# Patient Record
Sex: MALE | Race: WHITE | NOT HISPANIC OR LATINO | Employment: FULL TIME | URBAN - NONMETROPOLITAN AREA
[De-identification: names, ages, dates, MRNs, and addresses within clinical notes are randomized per-mention and may not be internally consistent; named-entity substitution may affect disease eponyms.]

---

## 2022-05-20 LAB
EXTERNAL HIV CONFIRMATION: NORMAL
EXTERNAL HIV SCREEN: NORMAL
HCV AB SER-ACNC: NEGATIVE

## 2024-04-25 ENCOUNTER — HOSPITAL ENCOUNTER (EMERGENCY)
Facility: HOSPITAL | Age: 29
Discharge: HOME | End: 2024-04-25
Attending: EMERGENCY MEDICINE
Payer: COMMERCIAL

## 2024-04-25 ENCOUNTER — APPOINTMENT (EMERGENCY)
Dept: CT IMAGING | Facility: HOSPITAL | Age: 29
End: 2024-04-25
Payer: COMMERCIAL

## 2024-04-25 VITALS
BODY MASS INDEX: 31.85 KG/M2 | HEART RATE: 81 BPM | SYSTOLIC BLOOD PRESSURE: 147 MMHG | OXYGEN SATURATION: 98 % | TEMPERATURE: 97.9 F | DIASTOLIC BLOOD PRESSURE: 93 MMHG | WEIGHT: 256.17 LBS | HEIGHT: 75 IN | RESPIRATION RATE: 17 BRPM

## 2024-04-25 DIAGNOSIS — K29.70 GASTRITIS: Primary | ICD-10-CM

## 2024-04-25 LAB
ALBUMIN SERPL BCP-MCNC: 4.9 G/DL (ref 3.5–5)
ALP SERPL-CCNC: 57 U/L (ref 34–104)
ALT SERPL W P-5'-P-CCNC: 25 U/L (ref 7–52)
ANION GAP SERPL CALCULATED.3IONS-SCNC: 6 MMOL/L (ref 4–13)
AST SERPL W P-5'-P-CCNC: 17 U/L (ref 13–39)
BASOPHILS # BLD AUTO: 0.04 THOUSANDS/ÂΜL (ref 0–0.1)
BASOPHILS NFR BLD AUTO: 1 % (ref 0–1)
BILIRUB SERPL-MCNC: 0.56 MG/DL (ref 0.2–1)
BILIRUB UR QL STRIP: NEGATIVE
BUN SERPL-MCNC: 17 MG/DL (ref 5–25)
CALCIUM SERPL-MCNC: 9.4 MG/DL (ref 8.4–10.2)
CHLORIDE SERPL-SCNC: 104 MMOL/L (ref 96–108)
CLARITY UR: CLEAR
CO2 SERPL-SCNC: 29 MMOL/L (ref 21–32)
COLOR UR: YELLOW
CREAT SERPL-MCNC: 0.93 MG/DL (ref 0.6–1.3)
EOSINOPHIL # BLD AUTO: 0.29 THOUSAND/ÂΜL (ref 0–0.61)
EOSINOPHIL NFR BLD AUTO: 4 % (ref 0–6)
ERYTHROCYTE [DISTWIDTH] IN BLOOD BY AUTOMATED COUNT: 11.9 % (ref 11.6–15.1)
FLUAV RNA RESP QL NAA+PROBE: NEGATIVE
FLUBV RNA RESP QL NAA+PROBE: NEGATIVE
GFR SERPL CREATININE-BSD FRML MDRD: 112 ML/MIN/1.73SQ M
GLUCOSE SERPL-MCNC: 80 MG/DL (ref 65–140)
GLUCOSE UR STRIP-MCNC: NEGATIVE MG/DL
HCT VFR BLD AUTO: 47.6 % (ref 36.5–49.3)
HGB BLD-MCNC: 16 G/DL (ref 12–17)
HGB UR QL STRIP.AUTO: NEGATIVE
IMM GRANULOCYTES # BLD AUTO: 0.02 THOUSAND/UL (ref 0–0.2)
IMM GRANULOCYTES NFR BLD AUTO: 0 % (ref 0–2)
KETONES UR STRIP-MCNC: NEGATIVE MG/DL
LACTATE SERPL-SCNC: 0.9 MMOL/L (ref 0.5–2)
LEUKOCYTE ESTERASE UR QL STRIP: NEGATIVE
LIPASE SERPL-CCNC: 23 U/L (ref 11–82)
LYMPHOCYTES # BLD AUTO: 2.34 THOUSANDS/ÂΜL (ref 0.6–4.47)
LYMPHOCYTES NFR BLD AUTO: 31 % (ref 14–44)
MCH RBC QN AUTO: 30 PG (ref 26.8–34.3)
MCHC RBC AUTO-ENTMCNC: 33.6 G/DL (ref 31.4–37.4)
MCV RBC AUTO: 89 FL (ref 82–98)
MONOCYTES # BLD AUTO: 0.51 THOUSAND/ÂΜL (ref 0.17–1.22)
MONOCYTES NFR BLD AUTO: 7 % (ref 4–12)
NEUTROPHILS # BLD AUTO: 4.43 THOUSANDS/ÂΜL (ref 1.85–7.62)
NEUTS SEG NFR BLD AUTO: 57 % (ref 43–75)
NITRITE UR QL STRIP: NEGATIVE
NRBC BLD AUTO-RTO: 0 /100 WBCS
PH UR STRIP.AUTO: 6 [PH]
PLATELET # BLD AUTO: 274 THOUSANDS/UL (ref 149–390)
PMV BLD AUTO: 10.2 FL (ref 8.9–12.7)
POTASSIUM SERPL-SCNC: 3.9 MMOL/L (ref 3.5–5.3)
PROT SERPL-MCNC: 7.6 G/DL (ref 6.4–8.4)
PROT UR STRIP-MCNC: NEGATIVE MG/DL
RBC # BLD AUTO: 5.33 MILLION/UL (ref 3.88–5.62)
RSV RNA RESP QL NAA+PROBE: NEGATIVE
SARS-COV-2 RNA RESP QL NAA+PROBE: NEGATIVE
SODIUM SERPL-SCNC: 139 MMOL/L (ref 135–147)
SP GR UR STRIP.AUTO: 1.01 (ref 1–1.03)
UROBILINOGEN UR QL STRIP.AUTO: 0.2 E.U./DL
WBC # BLD AUTO: 7.63 THOUSAND/UL (ref 4.31–10.16)

## 2024-04-25 PROCEDURE — 96361 HYDRATE IV INFUSION ADD-ON: CPT

## 2024-04-25 PROCEDURE — 36415 COLL VENOUS BLD VENIPUNCTURE: CPT

## 2024-04-25 PROCEDURE — 83605 ASSAY OF LACTIC ACID: CPT | Performed by: PHYSICIAN ASSISTANT

## 2024-04-25 PROCEDURE — 0241U HB NFCT DS VIR RESP RNA 4 TRGT: CPT | Performed by: PHYSICIAN ASSISTANT

## 2024-04-25 PROCEDURE — 96374 THER/PROPH/DIAG INJ IV PUSH: CPT

## 2024-04-25 PROCEDURE — 99284 EMERGENCY DEPT VISIT MOD MDM: CPT

## 2024-04-25 PROCEDURE — 99285 EMERGENCY DEPT VISIT HI MDM: CPT | Performed by: EMERGENCY MEDICINE

## 2024-04-25 PROCEDURE — 81003 URINALYSIS AUTO W/O SCOPE: CPT

## 2024-04-25 PROCEDURE — 96375 TX/PRO/DX INJ NEW DRUG ADDON: CPT

## 2024-04-25 PROCEDURE — 80053 COMPREHEN METABOLIC PANEL: CPT

## 2024-04-25 PROCEDURE — C9113 INJ PANTOPRAZOLE SODIUM, VIA: HCPCS | Performed by: PHYSICIAN ASSISTANT

## 2024-04-25 PROCEDURE — 74177 CT ABD & PELVIS W/CONTRAST: CPT

## 2024-04-25 PROCEDURE — 85025 COMPLETE CBC W/AUTO DIFF WBC: CPT

## 2024-04-25 PROCEDURE — 83690 ASSAY OF LIPASE: CPT

## 2024-04-25 RX ORDER — PANTOPRAZOLE SODIUM 20 MG/1
20 TABLET, DELAYED RELEASE ORAL DAILY
Qty: 20 TABLET | Refills: 0 | Status: SHIPPED | OUTPATIENT
Start: 2024-04-25 | End: 2024-04-29 | Stop reason: SDUPTHER

## 2024-04-25 RX ORDER — ONDANSETRON 4 MG/1
4 TABLET, FILM COATED ORAL EVERY 6 HOURS
Qty: 12 TABLET | Refills: 0 | Status: SHIPPED | OUTPATIENT
Start: 2024-04-25

## 2024-04-25 RX ORDER — ONDANSETRON 2 MG/ML
4 INJECTION INTRAMUSCULAR; INTRAVENOUS ONCE
Status: COMPLETED | OUTPATIENT
Start: 2024-04-25 | End: 2024-04-25

## 2024-04-25 RX ORDER — PANTOPRAZOLE SODIUM 40 MG/10ML
40 INJECTION, POWDER, LYOPHILIZED, FOR SOLUTION INTRAVENOUS ONCE
Status: COMPLETED | OUTPATIENT
Start: 2024-04-25 | End: 2024-04-25

## 2024-04-25 RX ORDER — KETOROLAC TROMETHAMINE 30 MG/ML
15 INJECTION, SOLUTION INTRAMUSCULAR; INTRAVENOUS ONCE
Status: COMPLETED | OUTPATIENT
Start: 2024-04-25 | End: 2024-04-25

## 2024-04-25 RX ADMIN — SODIUM CHLORIDE 1000 ML: 0.9 INJECTION, SOLUTION INTRAVENOUS at 10:44

## 2024-04-25 RX ADMIN — PANTOPRAZOLE SODIUM 40 MG: 40 INJECTION, POWDER, LYOPHILIZED, FOR SOLUTION INTRAVENOUS at 10:46

## 2024-04-25 RX ADMIN — KETOROLAC TROMETHAMINE 15 MG: 30 INJECTION, SOLUTION INTRAMUSCULAR at 10:45

## 2024-04-25 RX ADMIN — ONDANSETRON 4 MG: 2 INJECTION INTRAMUSCULAR; INTRAVENOUS at 10:46

## 2024-04-25 RX ADMIN — IOHEXOL 100 ML: 350 INJECTION, SOLUTION INTRAVENOUS at 12:00

## 2024-04-25 NOTE — Clinical Note
Anil Bailey was seen and treated in our emergency department on 4/25/2024.                Diagnosis:     Anil  is off the rest of the shift today, may return to work on return date.    He may return on this date: 04/26/2024         If you have any questions or concerns, please don't hesitate to call.      Ashish Alvarado PA-C    ______________________________           _______________          _______________  Hospital Representative                              Date                                Time

## 2024-04-25 NOTE — ED PROVIDER NOTES
History  Chief Complaint   Patient presents with    Abdominal Pain     Abdominal pain, bloating, continuous belching since yesterday afternoon. Took prilosec x 2 without relief.      The patient is a 28-year-old male presents emerged from today with a complaint of left upper quadrant abdominal pain since yesterday.  The patient states that he ate at a local fair.  Did eat a large amount of greasy food.  Patient states he had some pain in the later aspects of the evening however seemed to improve at night.  Awoke today feeling fine but while at work started to feel nauseated and had belching with left upper quadrant abdominal pain so came in for evaluation.  He has a past medical history of hepatic steatosis      Abdominal Pain  Pain location:  LUQ  Pain quality: not bloating    Pain radiates to:  Does not radiate  Timing:  Constant  Chronicity:  New  Context: eating and recent travel    Relieved by:  Nothing  Worsened by:  Nothing  Ineffective treatments:  Lying down and liquids  Associated symptoms: belching        None       Past Medical History:   Diagnosis Date    Autism     Hepatic steatosis     Sensory disorder        History reviewed. No pertinent surgical history.    History reviewed. No pertinent family history.  I have reviewed and agree with the history as documented.    E-Cigarette/Vaping    E-Cigarette Use Never User      E-Cigarette/Vaping Substances     Social History     Tobacco Use    Smoking status: Never    Smokeless tobacco: Never   Vaping Use    Vaping status: Never Used   Substance Use Topics    Alcohol use: Not Currently    Drug use: Never       Review of Systems   Gastrointestinal:  Positive for abdominal pain.   All other systems reviewed and are negative.      Physical Exam  Physical Exam  Vitals and nursing note reviewed.   Constitutional:       General: He is in acute distress.      Appearance: He is well-developed.   HENT:      Head: Normocephalic and atraumatic.   Eyes:      Pupils:  Pupils are equal, round, and reactive to light.   Cardiovascular:      Rate and Rhythm: Normal rate and regular rhythm.      Heart sounds: Normal heart sounds. No murmur heard.  Pulmonary:      Effort: Pulmonary effort is normal. No respiratory distress.      Breath sounds: Normal breath sounds.   Abdominal:      General: Bowel sounds are normal.      Palpations: Abdomen is soft.      Tenderness: There is abdominal tenderness in the epigastric area and left upper quadrant. There is no right CVA tenderness or left CVA tenderness.   Musculoskeletal:      Cervical back: Normal range of motion.   Skin:     General: Skin is warm and dry.      Capillary Refill: Capillary refill takes less than 2 seconds.   Neurological:      General: No focal deficit present.      Mental Status: He is alert and oriented to person, place, and time.   Psychiatric:         Behavior: Behavior normal.         Vital Signs  ED Triage Vitals [04/25/24 1033]   Temperature Pulse Respirations Blood Pressure SpO2   97.9 °F (36.6 °C) 81 16 141/95 99 %      Temp Source Heart Rate Source Patient Position - Orthostatic VS BP Location FiO2 (%)   Temporal Monitor Lying Right arm --      Pain Score       6           Vitals:    04/25/24 1145 04/25/24 1215 04/25/24 1230 04/25/24 1300   BP: 145/92 156/89 141/84 147/93   Pulse: 76 75 86 81   Patient Position - Orthostatic VS:   Sitting Sitting         Visual Acuity      ED Medications  Medications   sodium chloride 0.9 % bolus 1,000 mL (0 mL Intravenous Stopped 4/25/24 1205)   pantoprazole (PROTONIX) injection 40 mg (40 mg Intravenous Given 4/25/24 1046)   ketorolac (TORADOL) injection 15 mg (15 mg Intravenous Given 4/25/24 1045)   ondansetron (ZOFRAN) injection 4 mg (4 mg Intravenous Given 4/25/24 1046)   iohexol (OMNIPAQUE) 350 MG/ML injection (MULTI-DOSE) 100 mL (100 mL Intravenous Given 4/25/24 1200)       Diagnostic Studies  Results Reviewed       Procedure Component Value Units Date/Time    FLU/RSV/COVID  - if FLU/RSV clinically relevant [[id number]]  (Normal) Collected: 04/25/24 1042    Lab Status: Final result Specimen: Nares from Nose Updated: 04/25/24 1215     SARS-CoV-2 Negative     INFLUENZA A PCR Negative     INFLUENZA B PCR Negative     RSV PCR Negative    Narrative:      FOR PEDIATRIC PATIENTS - copy/paste COVID Guidelines URL to browser: [email or web address]    SARS-CoV-2 assay is a Nucleic Acid Amplification assay intended for the  qualitative detection of nucleic acid from SARS-CoV-2 in nasopharyngeal  swabs. Results are for the presumptive identification of SARS-CoV-2 RNA.    Positive results are indicative of infection with SARS-CoV-2, the virus  causing COVID-19, but do not rule out bacterial infection or co-infection  with other viruses. Laboratories within the [place] and its  territories are required to report all positive results to the appropriate  public health authorities. Negative results do not preclude SARS-CoV-2  infection and should not be used as the sole basis for treatment or other  patient management decisions. Negative results must be combined with  clinical observations, patient history, and epidemiological information.  This test has not been FDA cleared or approved.    This test has been authorized by FDA under an Emergency Use Authorization  (EUA). This test is only authorized for the duration of time the  declaration that circumstances exist justifying the authorization of the  emergency use of an in vitro diagnostic tests for detection of SARS-CoV-2  virus and/or diagnosis of COVID-19 infection under section 564(b)(1) of  the Act, 21 U.S.C. 360bbb-3(b)(1), unless the authorization is terminated  or revoked sooner. The test has been validated but independent review by FDA  and CLIA is pending.    Test performed using [employer]: This RT-PCR assay targets N2,  a region unique to SARS-CoV-2. A conserved region in the  E-gene was chosen  for pan-Sarbecovirus detection which includes SARS-CoV-2.    According to CMS-2020-01-R, this platform meets the definition of high-throughput technology.    Lactic acid, plasma (w/reflex if result > 2.0) [[id number]]  (Normal) Collected: 04/25/24 1042    Lab Status: Final result Specimen: Blood from Arm, Left Updated: 04/25/24 1113     LACTIC ACID 0.9 mmol/L     Narrative:      Result may be elevated if tourniquet was used during collection.    Comprehensive metabolic panel [[id number]] Collected: 04/25/24 1042    Lab Status: Final result Specimen: Blood from Arm, Left Updated: 04/25/24 1112     Sodium 139 mmol/L      Potassium 3.9 mmol/L      Chloride 104 mmol/L      CO2 29 mmol/L      ANION GAP 6 mmol/L      BUN 17 mg/dL      Creatinine 0.93 mg/dL      Glucose 80 mg/dL      Calcium 9.4 mg/dL      AST 17 U/L      ALT 25 U/L      Alkaline Phosphatase 57 U/L      Total Protein 7.6 g/dL      Albumin 4.9 g/dL      Total Bilirubin 0.56 mg/dL      eGFR 112 ml/min/1.73sq m     Narrative:      National Kidney Disease Foundation guidelines for Chronic Kidney Disease (CKD):     Stage 1 with normal or high GFR (GFR > 90 mL/min/1.73 square meters)    Stage 2 Mild CKD (GFR = 60-89 mL/min/1.73 square meters)    Stage 3A Moderate CKD (GFR = 45-59 mL/min/1.73 square meters)    Stage 3B Moderate CKD (GFR = 30-44 mL/min/1.73 square meters)    Stage 4 Severe CKD (GFR = 15-29 mL/min/1.73 square meters)    Stage 5 End Stage CKD (GFR <15 mL/min/1.73 square meters)  Note: GFR calculation is accurate only with a steady state creatinine    Lipase [[id number]]  (Normal) Collected: 04/25/24 1042    Lab Status: Final result Specimen: Blood from Arm, Left Updated: 04/25/24 1112     Lipase 23 u/L     UA w Reflex to Microscopic w Reflex to Culture [[id number]] Collected: 04/25/24 1049    Lab Status: Final result Specimen: Urine, Clean Catch Updated: 04/25/24 1107     Color, UA Yellow     Clarity, UA Clear     Specific Morris, UA  1.010     pH, UA 6.0     Leukocytes, UA Negative     Nitrite, UA Negative     Protein, UA Negative mg/dl      Glucose, UA Negative mg/dl      Ketones, UA Negative mg/dl      Urobilinogen, UA 0.2 E.U./dl      Bilirubin, UA Negative     Occult Blood, UA Negative    CBC and differential [[id number]] Collected: 04/25/24 1042    Lab Status: Final result Specimen: Blood from Arm, Left Updated: 04/25/24 1055     WBC 7.63 Thousand/uL      RBC 5.33 Million/uL      Hemoglobin 16.0 g/dL      Hematocrit 47.6 %      MCV 89 fL      MCH 30.0 pg      MCHC 33.6 g/dL      RDW 11.9 %      MPV 10.2 fL      Platelets 274 Thousands/uL      nRBC 0 /100 WBCs      Segmented % 57 %      Immature Grans % 0 %      Lymphocytes % 31 %      Monocytes % 7 %      Eosinophils Relative 4 %      Basophils Relative 1 %      Absolute Neutrophils 4.43 Thousands/µL      Absolute Immature Grans 0.02 Thousand/uL      Absolute Lymphocytes 2.34 Thousands/µL      Absolute Monocytes 0.51 Thousand/µL      Eosinophils Absolute 0.29 Thousand/µL      Basophils Absolute 0.04 Thousands/µL                    CT abdomen pelvis with contrast   Final Result by Deo Chakraborty MD (04/25 1301)      No evidence of acute abdominopelvic process.         Workstation performed: [id number]                    Procedures  Procedures         ED Course                               SBIRT 20yo+      Flowsheet Row Most Recent Value   Initial Alcohol Screen: US AUDIT-C     1. How often do you have a drink containing alcohol? 2 Filed at: 04/25/2024 1108   2. How many drinks containing alcohol do you have on a typical day you are drinking?  2 Filed at: 04/25/2024 1108   3a. Male UNDER 65: How often do you have five or more drinks on one occasion? 0 Filed at: 04/25/2024 1108   Audit-C Score 4 Filed at: 04/25/2024 1108   GONZÁLEZ: How many times in the past year have you...    Used an illegal drug or used a prescription medication for non-medical reasons? Never Filed at: 04/25/2024  1108                      Medical Decision Making  The patient is a 28-year-old male presents emerged from today with a complaint of left upper quadrant abdominal pain since yesterday.  The patient states that he ate at a local fair.  Did eat a large amount of greasy food.  Patient states he had some pain in the later aspects of the evening however seemed to improve at night.  Awoke today feeling fine but while at work started to feel nauseated and had belching with left upper quadrant abdominal pain so came in for evaluation.  He has a past medical history of hepatic steatosis    The patient's lab work overall unremarkable imaging studies negative.  Patient's condition most consistent with gastritis.  Will treat with PPI and Zofran.  Patient in agreement treatment plan.      Differential diagnosis was included but not limited to: GERD, gastritis, PUD, esophageal spasm, pancreatitis, acute cholecystitis, acute cholangitis, biliary colic, acute cystitis, renal colic, kidney stone, MSK pain, diverticulitis, constipation, proctitis, small bowel obstruction, large bowel obstruction, mass, viral syndrome      Amount and/or Complexity of Data Reviewed  Labs: ordered. Decision-making details documented in ED Course.  Radiology: ordered and independent interpretation performed. Decision-making details documented in ED Course.    Risk  Prescription drug management.             Disposition  Final diagnoses:   Gastritis     Time reflects when diagnosis was documented in both MDM as applicable and the Disposition within this note       Time User Action Codes Description Comment    4/25/2024  1:07 PM Ashish Alvarado Add [K29.70] Gastritis           ED Disposition       ED Disposition   Discharge    Condition   Stable    Date/Time   Thu Apr 25, 2024 [id number]    Comment   Anil Bailey discharge to home/self care.                   Follow-up Information    None         Discharge Medication List as of 4/25/2024  1:07 PM        START  taking these medications    Details   ondansetron (ZOFRAN) 4 mg tablet Take 1 tablet (4 mg total) by mouth every 6 (six) hours, Starting Thu 4/25/2024, Normal      pantoprazole (PROTONIX) 20 mg tablet Take 1 tablet (20 mg total) by mouth daily, Starting Thu 4/25/2024, Normal             No discharge procedures on file.    PDMP Review       None            ED Provider  Electronically Signed by             Ashish Alvarado PA-C  04/25/24 5106

## 2024-04-25 NOTE — ED ATTENDING ATTESTATION
4/25/2024  I, Waqar Craig MD, saw and evaluated the patient. I have discussed the patient with the resident/non-physician practitioner and agree with the resident's/non-physician practitioner's findings, Plan of Care, and MDM as documented in the resident's/non-physician practitioner's note, except where noted. All available labs and Radiology studies were reviewed.  I was present for key portions of any procedure(s) performed by the resident/non-physician practitioner and I was immediately available to provide assistance.       At this point I agree with the current assessment done in the Emergency Department.  I have conducted an independent evaluation of this patient a history and physical is as follows:    ED Course     Complains of epigastric pain starting yesterday.  No radiation.    Exam the patient is awake alert.  No acute distress.  Lungs are clear to auscultation.  Heart is a regular rate and rhythm.  Abdomen soft with tenderness in the epigastric region.  Bowel sounds are present.  No guarding or rebound.    Critical Care Time  Procedures

## 2024-04-26 ENCOUNTER — TELEPHONE (OUTPATIENT)
Dept: ADMINISTRATIVE | Facility: OTHER | Age: 29
End: 2024-04-26

## 2024-04-26 NOTE — TELEPHONE ENCOUNTER
Upon review of the In Basket request we were able to locate, review, and update the patient chart as requested for Hepatitis C  and HIV.    Any additional questions or concerns should be emailed to the Practice Liaisons via the appropriate education email address, please do not reply via In Basket.    Thank you  Garret Young MA

## 2024-04-26 NOTE — TELEPHONE ENCOUNTER
----- Message from Belgica Mari sent at 4/26/2024 10:13 AM EDT -----  Regarding: Care Gap Request  04/26/24 10:13 AM    Hello, our patient No patient name on file. has had Hepatitis C completed/performed. Please assist in updating the patient chart by pulling the document from LAB Tab within Chart Review. The date of service is 05/20/2022.     Thank you,  Belgica KO [facility]    04/26/24 10:13 AM    Hello, our patient No patient name on file. has had HIV completed/performed. Please assist in updating the patient chart by pulling the document from LAB Tab within Chart Review. The date of service is 05/20/2022.     Thank you,  Belgica Mari  [facility]

## 2024-04-29 ENCOUNTER — OFFICE VISIT (OUTPATIENT)
Dept: FAMILY MEDICINE CLINIC | Facility: CLINIC | Age: 29
End: 2024-04-29
Payer: COMMERCIAL

## 2024-04-29 VITALS
HEIGHT: 75 IN | WEIGHT: 251.32 LBS | OXYGEN SATURATION: 98 % | SYSTOLIC BLOOD PRESSURE: 138 MMHG | HEART RATE: 97 BPM | BODY MASS INDEX: 31.25 KG/M2 | DIASTOLIC BLOOD PRESSURE: 90 MMHG

## 2024-04-29 DIAGNOSIS — R03.0 ELEVATED BLOOD PRESSURE READING IN OFFICE WITHOUT DIAGNOSIS OF HYPERTENSION: ICD-10-CM

## 2024-04-29 DIAGNOSIS — K29.70 GASTRITIS: ICD-10-CM

## 2024-04-29 DIAGNOSIS — K76.0 HEPATIC STEATOSIS: ICD-10-CM

## 2024-04-29 DIAGNOSIS — F84.0 AUTISM SPECTRUM DISORDER: ICD-10-CM

## 2024-04-29 DIAGNOSIS — R10.9 CHRONIC ABDOMINAL PAIN: ICD-10-CM

## 2024-04-29 DIAGNOSIS — Z00.00 ANNUAL PHYSICAL EXAM: Primary | ICD-10-CM

## 2024-04-29 DIAGNOSIS — G89.29 CHRONIC ABDOMINAL PAIN: ICD-10-CM

## 2024-04-29 PROCEDURE — 99385 PREV VISIT NEW AGE 18-39: CPT | Performed by: FAMILY MEDICINE

## 2024-04-29 PROCEDURE — 99204 OFFICE O/P NEW MOD 45 MIN: CPT | Performed by: FAMILY MEDICINE

## 2024-04-29 PROCEDURE — 3725F SCREEN DEPRESSION PERFORMED: CPT | Performed by: FAMILY MEDICINE

## 2024-04-29 RX ORDER — PANTOPRAZOLE SODIUM 20 MG/1
20 TABLET, DELAYED RELEASE ORAL DAILY
Qty: 90 TABLET | Refills: 0 | Status: SHIPPED | OUTPATIENT
Start: 2024-04-29

## 2024-04-29 NOTE — PROGRESS NOTES
ADULT ANNUAL PHYSICAL  [facility] - [facility] PRIMARY CARE    NAME: Anil Bailey  AGE: 29 y.o. SEX: male  : 1995     DATE: 2024     Assessment and Plan:     Problem List Items Addressed This Visit       Autism spectrum disorder     Dx in . He has never required therapy or medications but admits in the past year or so he has increased senses of feeling overwhelmed by noises around him. Feeling overstimulated.     We discussed initiating regular therapy. Handout of local providers given to him.         Hepatic steatosis     Abd US 2019: Fatty/fibrotic change of the liver   LFTs wnl on labs during recent Ed visit last week  Discussed importance of healthy diet and exercise  Prior labs reveal hepatitis B immunity  Advised alcohol cessation - currently drinks 6-8 per week on average  Continue to monitor         Relevant Orders    Ambulatory Referral to Gastroenterology    Chronic abdominal pain     Chronic, previously suspected gastritis  Patient has a lot of gas and bloating - was supposed to have endoscopy in 2017 but had to cancel due to insurance at that time. Recently symptoms have been worsening and he would like to re-est with GI.   We reviewed his CT from recent ED visit - Diverticulosis without diverticulitis, small fat containing umbilical hernia, no acute findings  CBC, CMP, Lipase wnl  Recommend he continue PPI, gastritis diet handout provided, refer to GI for further evaluation           Relevant Orders    Ambulatory Referral to Gastroenterology    Elevated blood pressure reading in office without diagnosis of hypertension     Current Blood Pressure: 138/90  Recommend home BP monitoring and low sodium diet - handout provided  Patient to reach out if remains high at home - consider adding ACEi  Continue to monitor          Other Visit Diagnoses       Annual physical exam    -  Primary    Gastritis        Relevant Medications    pantoprazole  (PROTONIX) 20 mg tablet            Immunizations and preventive care screenings were discussed with patient today. Appropriate education was printed on patient's after visit summary.    Counseling:  Alcohol/drug use: discussed moderation in alcohol intake, the recommendations for healthy alcohol use, and avoidance of illicit drug use.  Dental Health: discussed importance of regular tooth brushing, flossing, and dental visits.  Exercise: the importance of regular exercise/physical activity was discussed. Recommend exercise 3-5 times per week for at least 30 minutes.       Depression Screening and Follow-up Plan: Patient was screened for depression during today's encounter. They screened negative with a PHQ-2 score of 0.      ED labs reviewed from 4/25/2024: CBC, UA, CMP, Lipase      Return in about 1 year (around 4/29/2025) for Annual physical.     Chief Complaint:     Chief Complaint   Patient presents with    New Patient Visit     I'm looking for a primary care physician. Had a recent issue with abdominal   pain, indigestion, belching which I was seen at [facility] [place] ED today. I've also been   having times of feeling overstimulated lately, perhaps anxiety. I was going to   have an endoscopy done a couple years ago to check for stomach ulcers but my   insurance wouldn't cover it at the time; hopefully we can get our ducks in a row   with referrals and get that squared away. I'm hoping you could help! Thank you   in advance!         History of Present Illness:     Adult Annual Physical   Patient here for a comprehensive physical exam, est care, and discuss concerns of abdominal pain.    Abdominal Pain  This is a recurrent problem. The current episode started in the past 7 days. The onset quality is sudden. The problem occurs intermittently. The most recent episode lasted 2 days. The problem has been gradually improving. The pain is located in the LUQ. The pain is at a severity of 4/10. The quality of the pain is a sensation  of fullness. The abdominal pain does not radiate. Associated symptoms include belching and flatus. Pertinent negatives include no anorexia, arthralgias, constipation, diarrhea, dysuria, fever, frequency, headaches, hematochezia, hematuria, melena, myalgias, nausea, vomiting or weight loss. The pain is aggravated by certain positions and movement. The pain is relieved by Being still and recumbency. Prior diagnostic workup includes CT scan.     He was supposed to have an upper endoscopy in 2017 for concerns of ulcers or gastritis. He never had this done due to insurance issues. The abdominal pain, belching and increased gas has been present since. He overall feels gassy often.     Patient was seen in the ED on 4/25/2024  CBC, CMP, lipase, lactate, urinalysis all within normal limits  COVID flu RSV was negative  CT abdomen pelvis with contrast was completed and without any acute findings.  There was a small fat-containing umbilical hernia.  He had few scattered colonic diverticula without diverticulitis.  He was discharged from the ED with as needed Zofran and daily Protonix.  Looking back patient has had multiple abdominal ultrasounds and CT scans from 2017 till 2024.  He reports he was supposed to have a colonoscopy done in the past but never completed this.  He has not needed the zofran. He has been taking the protonix.       PMH: autism, hepatic steatosis   SurgHx: none  Allergies:none  Medications: protonix  FamHx: Father had stroke in 2015 (age 53); Mother - chronic back pain, anxiety/depression unspecified  SocialHx:   Tobacco: none   Alcohol: socially 6-8 per week on average    Relationship: single, no children    Career: medic in reading     Diet and Physical Activity  Diet/Nutrition: poor diet.   Exercise: no formal exercise.      Depression Screening  PHQ-2/9 Depression Screening    Little interest or pleasure in doing things: 0 - not at all  Feeling down, depressed, or hopeless: 0 - not at all  PHQ-2 Score:  0  PHQ-2 Interpretation: Negative depression screen       General Health  Sleep:  4-7 on average, overall not great sleep. He works shifts, 2 days, 2 nights, 4 off   Hearing: normal - bilateral.  Vision: no vision problems.   Dental: regular dental visits.        Health  History of STDs?: no.     Review of Systems:     Review of Systems   Constitutional:  Negative for activity change, appetite change, chills, diaphoresis, fatigue, fever, unexpected weight change and weight loss.   HENT:  Negative for congestion, rhinorrhea, sore throat and trouble swallowing.    Eyes:  Negative for visual disturbance.   Respiratory:  Negative for cough, choking and shortness of breath.    Cardiovascular:  Negative for chest pain, palpitations and leg swelling.   Gastrointestinal:  Positive for abdominal pain and flatus. Negative for anorexia, blood in stool, constipation, diarrhea, hematochezia, melena, nausea and vomiting.   Genitourinary:  Negative for difficulty urinating, dysuria, frequency and hematuria.   Musculoskeletal:  Negative for arthralgias, joint swelling and myalgias.   Skin:  Negative for color change and rash.   Allergic/Immunologic: Negative for environmental allergies and food allergies.   Neurological:  Negative for dizziness, light-headedness and headaches.   Psychiatric/Behavioral:  Negative for dysphoric mood. The patient is not nervous/anxious.       Past Medical History:     Past Medical History:   Diagnosis Date    Autism     Hepatic steatosis     Sensory disorder       Past Surgical History:     History reviewed. No pertinent surgical history.   Social History:     Social History     Socioeconomic History    Marital status: Single     Spouse name: None    Number of children: None    Years of education: None    Highest education level: None   Occupational History    None   Tobacco Use    Smoking status: Never    Smokeless tobacco: Never   Vaping Use    Vaping status: Never Used   Substance and Sexual  "Activity    Alcohol use: Not Currently    Drug use: Never    Sexual activity: None   Other Topics Concern    None   Social History Narrative    None     Social Determinants of Health     Financial Resource Strain: Not on file   Food Insecurity: No Food Insecurity (12/5/2019)    Received from [facility]    Hunger Vital Sign     Worried About Running Out of Food in the Last Year: Never true     Ran Out of Food in the Last Year: Never true   Transportation Needs: Not on file   Physical Activity: Not on file   Stress: Not on file   Social Connections: Not on file   Intimate Partner Violence: Not on file   Housing Stability: Not on file      Family History:     History reviewed. No pertinent family history.   Current Medications:     Current Outpatient Medications   Medication Sig Dispense Refill    ondansetron (ZOFRAN) 4 mg tablet Take 1 tablet (4 mg total) by mouth every 6 (six) hours 12 tablet 0    pantoprazole (PROTONIX) 20 mg tablet Take 1 tablet (20 mg total) by mouth daily 90 tablet 0     No current facility-administered medications for this visit.      Allergies:     No Known Allergies   Physical Exam:     /90 (BP Location: Right arm, Patient Position: Sitting, Cuff Size: Standard)   Pulse 97   Ht 6' 3\" (1.905 m)   Wt 114 kg (251 lb 5.2 oz)   SpO2 98%   BMI 31.41 kg/m²     Physical Exam  Vitals reviewed.   Constitutional:       General: He is not in acute distress.     Appearance: He is obese. He is not ill-appearing.      Comments: Patient sensitive to human touch secondary to autism    HENT:      Head: Normocephalic and atraumatic.      Right Ear: Tympanic membrane, ear canal and external ear normal.      Left Ear: Tympanic membrane, ear canal and external ear normal.      Nose: Nose normal.      Mouth/Throat:      Mouth: Mucous membranes are moist.      Pharynx: Oropharynx is clear. No oropharyngeal exudate or posterior oropharyngeal erythema.   Eyes:      Extraocular Movements: Extraocular movements " intact.      Conjunctiva/sclera: Conjunctivae normal.   Neck:      Thyroid: No thyroid mass or thyromegaly.   Cardiovascular:      Rate and Rhythm: Normal rate and regular rhythm.      Heart sounds: Normal heart sounds.   Pulmonary:      Effort: Pulmonary effort is normal.      Breath sounds: Normal breath sounds.   Abdominal:      General: Abdomen is flat. Bowel sounds are normal. There is no distension.      Palpations: Abdomen is soft.      Tenderness: There is abdominal tenderness (generalized, though difficult to fully assess discomfort as patient admits with his autism, he is sensitive to touch all together which was evident throughout the physcial exam.).   Musculoskeletal:      Cervical back: Neck supple.      Right lower leg: No edema.      Left lower leg: No edema.   Skin:     General: Skin is warm.   Neurological:      General: No focal deficit present.      Mental Status: He is alert. Mental status is at baseline.   Psychiatric:         Mood and Affect: Mood normal.         Behavior: Behavior normal.          Dot Tellez DO   [facility] PRIMARY CARE

## 2024-04-29 NOTE — ASSESSMENT & PLAN NOTE
Abd US 2019: Fatty/fibrotic change of the liver   LFTs wnl on labs during recent Ed visit last week  Discussed importance of healthy diet and exercise  Prior labs reveal hepatitis B immunity  Advised alcohol cessation - currently drinks 6-8 per week on average  Continue to monitor

## 2024-04-29 NOTE — ASSESSMENT & PLAN NOTE
Current Blood Pressure: 138/90  Recommend home BP monitoring and low sodium diet - handout provided  Patient to reach out if remains high at home - consider adding ACEi  Continue to monitor

## 2024-04-29 NOTE — ASSESSMENT & PLAN NOTE
Dx in 2001. He has never required therapy or medications but admits in the past year or so he has increased senses of feeling overwhelmed by noises around him. Feeling overstimulated.     We discussed initiating regular therapy. Handout of local providers given to him.

## 2024-04-29 NOTE — ASSESSMENT & PLAN NOTE
Chronic, previously suspected gastritis  Patient has a lot of gas and bloating - was supposed to have endoscopy in 2017 but had to cancel due to insurance at that time. Recently symptoms have been worsening and he would like to re-est with GI.   We reviewed his CT from recent ED visit - Diverticulosis without diverticulitis, small fat containing umbilical hernia, no acute findings  CBC, CMP, Lipase wnl  Recommend he continue PPI, gastritis diet handout provided, refer to GI for further evaluation

## 2024-05-10 ENCOUNTER — PATIENT MESSAGE (OUTPATIENT)
Dept: FAMILY MEDICINE CLINIC | Facility: CLINIC | Age: 29
End: 2024-05-10

## 2024-05-10 DIAGNOSIS — K29.70 GASTRITIS: ICD-10-CM

## 2024-05-10 RX ORDER — PANTOPRAZOLE SODIUM 20 MG/1
20 TABLET, DELAYED RELEASE ORAL DAILY
Qty: 30 TABLET | Refills: 0 | Status: SHIPPED | OUTPATIENT
Start: 2024-05-10

## 2024-05-29 DIAGNOSIS — K29.70 GASTRITIS: ICD-10-CM

## 2024-05-31 RX ORDER — ONDANSETRON 4 MG/1
4 TABLET, FILM COATED ORAL EVERY 6 HOURS
Qty: 12 TABLET | Refills: 0 | Status: SHIPPED | OUTPATIENT
Start: 2024-05-31

## 2024-06-10 ENCOUNTER — OFFICE VISIT (OUTPATIENT)
Dept: GASTROENTEROLOGY | Facility: CLINIC | Age: 29
End: 2024-06-10
Payer: COMMERCIAL

## 2024-06-10 VITALS
HEART RATE: 80 BPM | SYSTOLIC BLOOD PRESSURE: 135 MMHG | WEIGHT: 249.8 LBS | OXYGEN SATURATION: 98 % | HEIGHT: 75 IN | DIASTOLIC BLOOD PRESSURE: 88 MMHG | RESPIRATION RATE: 16 BRPM | BODY MASS INDEX: 31.06 KG/M2 | TEMPERATURE: 98.3 F

## 2024-06-10 DIAGNOSIS — R10.9 CHRONIC ABDOMINAL PAIN: ICD-10-CM

## 2024-06-10 DIAGNOSIS — K76.0 HEPATIC STEATOSIS: ICD-10-CM

## 2024-06-10 DIAGNOSIS — R14.2 BELCHING: ICD-10-CM

## 2024-06-10 DIAGNOSIS — G89.29 CHRONIC ABDOMINAL PAIN: ICD-10-CM

## 2024-06-10 DIAGNOSIS — R10.13 EPIGASTRIC PAIN: Primary | ICD-10-CM

## 2024-06-10 PROCEDURE — 99243 OFF/OP CNSLTJ NEW/EST LOW 30: CPT | Performed by: PHYSICIAN ASSISTANT

## 2024-06-10 NOTE — PATIENT INSTRUCTIONS
Stay on pantoprazole.   Small frequent meals.   Look into simethicone/gas x which can be helpful.   Check blood work, ultrasound and have EGD completed.

## 2024-06-10 NOTE — PROGRESS NOTES
[facility] Gastroenterology Specialists - Outpatient Consultation  Anil Bailey 29 y.o. male MRN: [id number]  Encounter: [id number]    ASSESSMENT AND PLAN:      1. Epigastric pain  2. Chronic abdominal pain  3. Belching    Patient with a constellation of waxing and waning upper GI symptoms dating back to 2019.  He was evaluated in the emergency department and serologic and 3D imaging was largely unremarkable.  Differential includes gastritis, PUD, biliary pathology, other functional disorder, gastroparesis, etc.    Given persistent symptoms despite trial of PPI therapy, I do think additional investigation is reasonable at this time.  We will start the investigation with an upper endoscopy to evaluate for intraluminal pathology and an ultrasound to evaluate any missed biliary pathology.  Continue PPI for now.  Work on small frequent meals and diet and lifestyle modifications for GERD.  Trial simethicone for excess belching.  May need to consider SIBO testing in future.    I obtained informed consent from the patient. The risks/benefits/alternatives of the procedure were discussed with the patient. Risks included, but not limited to, infection, bleeding, perforation, injury to organs in the abdomen, missed lesion and incomplete procedure were discussed. Patient was agreeable and electronic signature was obtained.    - EGD; Future  - Ambulatory Referral to Gastroenterology  - US right upper quadrant; Future  - Tissue transglutaminase, IgA; Future  - IgA; Future    4. Hepatic steatosis    History of such, suspect NAFLD/metabolic etiology.  Check elastography now.    Discussed recommendations in regards to fatty liver including:   Strict control of contributing comorbidities (obesity, prediabetes/diabetes, hypertension, and hypertriglyceridemia).  Weight loss of approx 10-15% of patient's current body weight over a period of 6-12 months through low fat diet and cardiovascular exercise as tolerated.  Limiting  alcohol consumption, preferably complete abstinence.  Monitor hepatic function every 6 months with routine labs.     - Ambulatory Referral to Gastroenterology  - US elastography/UGAP; Future    We will follow up after endoscopic evaluation.   ______________________________________________________________________    HPI: Patient is a 29 y.o. male who presents today for a consultation regarding abdominal pain. Pmhx sig for hepatic steatosis, ASD, BMI 31.     Pt is new to clinic. Was in ER for LUQ pain after [place] indiscretion [place].  He had 3D imaging completed as well as blood work which was largely unremarkable.  He was started on PPI therapy.    06/10/24:     Pt shares that he has developed waxing and waning upper abdominal pain and excess belching since 2019.  He had a severe bout of symptoms that resulted in ER evaluation in 04/2024. He was started on PPI at that time. Despite medication therapy, his symptoms continue. Notes dull aching pain in periumbilical region. Some nausea, no emesis, no hematemesis. No dysphagia or odynophagia. No early satiety. No unintentional weight loss over past 6 months.  He does share that he had the stomach bug last week which caused some acute nausea vomiting and nonbloody diarrhea, though this has resolved.    Patient was started on PPI daily in 04/2024, he shares that despite this medication his symptoms persist.    Patient is having formed brown stool daily.  He did try Metamucil which seemed to help regulate his stool, thinks it helped.   No BRBPR or melena. No nocturnal BM. No family hx of CRC or IBD.     Diet at times can be poor, eats a lot of fast quick food, such as pizza, cheese steak, hoagies, Guers ice tea.  Does not drink any excess carbonation or etoh.     04/2024: CT A/P: No evidence of acute abdominopelvic process.   04/2024: Hb 16.0, MCV 89, Plt 274, BUN 17, Cr 0.93, AST 17, ALT 25, ALP 57 albumin 4.9, lipase 23    NSAIDs: PRN   Etoh: rare   Tobacco: none  "  Cannabis: none     Endoscopic history:   EGD: none   Colon: none    Review of Systems   Constitutional:  Negative for fever.   HENT:  Negative for mouth sores and trouble swallowing.    Gastrointestinal:  Positive for abdominal pain. Negative for constipation, diarrhea, nausea and vomiting.   Genitourinary:  Negative for dysuria, frequency and hematuria.   Musculoskeletal:  Negative for arthralgias and myalgias.   Neurological:  Negative for headaches.   Otherwise Per HPI    Historical Information   Past Medical History:   Diagnosis Date    Autism     Hepatic steatosis     Sensory disorder      History reviewed. No pertinent surgical history.  Social History   Social History     Substance and Sexual Activity   Alcohol Use Not Currently     Social History     Substance and Sexual Activity   Drug Use Never     Social History     Tobacco Use   Smoking Status Never   Smokeless Tobacco Never     History reviewed. No pertinent family history.    Meds/Allergies       Current Outpatient Medications:     ondansetron (ZOFRAN) 4 mg tablet    pantoprazole (PROTONIX) 20 mg tablet    No Known Allergies    Objective     Blood pressure 135/88, pulse 80, temperature 98.3 °F (36.8 °C), temperature source Tympanic, resp. rate 16, height 6' 3\" (1.905 m), weight 113 kg (249 lb 12.8 oz), SpO2 98%. Body mass index is 31.22 kg/m².    Physical Exam  Vitals and nursing note reviewed.   Constitutional:       General: He is not in acute distress.     Appearance: He is well-developed.   HENT:      Head: Normocephalic and atraumatic.   Eyes:      General: No scleral icterus.     Conjunctiva/sclera: Conjunctivae normal.   Cardiovascular:      Rate and Rhythm: Normal rate.   Pulmonary:      Effort: Pulmonary effort is normal. No respiratory distress.   Abdominal:      General: Bowel sounds are normal. There is no distension.      Palpations: Abdomen is soft.      Tenderness: There is abdominal tenderness (RUQ, right flank pain). There is no " rebound.   Skin:     General: Skin is warm and dry.      Coloration: Skin is not jaundiced.   Neurological:      General: No focal deficit present.      Mental Status: He is alert and oriented to person, place, and time.   Psychiatric:         Mood and Affect: Mood normal.         Behavior: Behavior normal.        Lab Results:   No visits with results within 1 Day(s) from this visit.   Latest known visit with results is:   Telephone on 04/26/2024   Component Date Value    HIV Screen 05/20/2022 Non-Reactive     HIV Confirmation 05/20/2022 Non-Reactive     HEP C AB 05/20/2022 Negative      Radiology Results:   No results found.    Milagro Jacobs PA-C    **Please note:  Dictation voice to text software may have been used in the creation of this record.  Occasional wrong word or “sound alike” substitutions may have occurred due to the inherent limitations of voice recognition software.  Read the chart carefully and recognize, using context, where substitutions have occurred.**

## 2024-06-11 ENCOUNTER — APPOINTMENT (OUTPATIENT)
Dept: LAB | Facility: HOSPITAL | Age: 29
End: 2024-06-11
Payer: COMMERCIAL

## 2024-06-11 ENCOUNTER — HOSPITAL ENCOUNTER (OUTPATIENT)
Dept: ULTRASOUND IMAGING | Facility: HOSPITAL | Age: 29
Discharge: HOME | End: 2024-06-11
Payer: COMMERCIAL

## 2024-06-11 DIAGNOSIS — G89.29 CHRONIC ABDOMINAL PAIN: ICD-10-CM

## 2024-06-11 DIAGNOSIS — R10.9 CHRONIC ABDOMINAL PAIN: ICD-10-CM

## 2024-06-11 DIAGNOSIS — K76.0 HEPATIC STEATOSIS: ICD-10-CM

## 2024-06-11 LAB — IGA SERPL-MCNC: 134 MG/DL (ref 66–433)

## 2024-06-11 PROCEDURE — 82784 ASSAY IGA/IGD/IGG/IGM EACH: CPT

## 2024-06-11 PROCEDURE — 36415 COLL VENOUS BLD VENIPUNCTURE: CPT

## 2024-06-11 PROCEDURE — 76705 ECHO EXAM OF ABDOMEN: CPT

## 2024-06-11 PROCEDURE — 86364 TISS TRNSGLTMNASE EA IG CLAS: CPT

## 2024-06-11 PROCEDURE — 76981 USE PARENCHYMA: CPT

## 2024-06-13 ENCOUNTER — PATIENT MESSAGE (OUTPATIENT)
Dept: GASTROENTEROLOGY | Facility: CLINIC | Age: 29
End: 2024-06-13

## 2024-06-13 LAB — TTG IGA SER-ACNC: <2 U/ML (ref 0–3)

## 2024-06-19 ENCOUNTER — ANESTHESIA (OUTPATIENT)
Dept: PERIOP | Facility: HOSPITAL | Age: 29
End: 2024-06-19

## 2024-06-19 ENCOUNTER — HOSPITAL ENCOUNTER (OUTPATIENT)
Dept: PERIOP | Facility: HOSPITAL | Age: 29
Setting detail: OUTPATIENT SURGERY
Discharge: HOME | End: 2024-06-19
Payer: COMMERCIAL

## 2024-06-19 ENCOUNTER — ANESTHESIA EVENT (OUTPATIENT)
Dept: PERIOP | Facility: HOSPITAL | Age: 29
End: 2024-06-19

## 2024-06-19 VITALS
DIASTOLIC BLOOD PRESSURE: 83 MMHG | OXYGEN SATURATION: 98 % | HEART RATE: 83 BPM | SYSTOLIC BLOOD PRESSURE: 120 MMHG | WEIGHT: 250 LBS | TEMPERATURE: 98.1 F | BODY MASS INDEX: 31.08 KG/M2 | RESPIRATION RATE: 18 BRPM | HEIGHT: 75 IN

## 2024-06-19 DIAGNOSIS — R10.13 EPIGASTRIC PAIN: ICD-10-CM

## 2024-06-19 PROCEDURE — 43239 EGD BIOPSY SINGLE/MULTIPLE: CPT | Performed by: STUDENT IN AN ORGANIZED HEALTH CARE EDUCATION/TRAINING PROGRAM

## 2024-06-19 PROCEDURE — 88305 TISSUE EXAM BY PATHOLOGIST: CPT | Performed by: PATHOLOGY

## 2024-06-19 RX ORDER — ONDANSETRON 2 MG/ML
4 INJECTION INTRAMUSCULAR; INTRAVENOUS ONCE AS NEEDED
Status: DISCONTINUED | OUTPATIENT
Start: 2024-06-19 | End: 2024-06-23 | Stop reason: HOSPADM

## 2024-06-19 RX ORDER — PROPOFOL 10 MG/ML
INJECTION, EMULSION INTRAVENOUS AS NEEDED
Status: DISCONTINUED | OUTPATIENT
Start: 2024-06-19 | End: 2024-06-19

## 2024-06-19 RX ORDER — SODIUM CHLORIDE, SODIUM LACTATE, POTASSIUM CHLORIDE, CALCIUM CHLORIDE 600; 310; 30; 20 MG/100ML; MG/100ML; MG/100ML; MG/100ML
100 INJECTION, SOLUTION INTRAVENOUS CONTINUOUS
Status: DISCONTINUED | OUTPATIENT
Start: 2024-06-19 | End: 2024-06-23 | Stop reason: HOSPADM

## 2024-06-19 RX ORDER — SODIUM CHLORIDE, SODIUM LACTATE, POTASSIUM CHLORIDE, CALCIUM CHLORIDE 600; 310; 30; 20 MG/100ML; MG/100ML; MG/100ML; MG/100ML
INJECTION, SOLUTION INTRAVENOUS CONTINUOUS PRN
Status: DISCONTINUED | OUTPATIENT
Start: 2024-06-19 | End: 2024-06-19

## 2024-06-19 RX ORDER — LIDOCAINE HYDROCHLORIDE 20 MG/ML
INJECTION, SOLUTION EPIDURAL; INFILTRATION; INTRACAUDAL; PERINEURAL AS NEEDED
Status: DISCONTINUED | OUTPATIENT
Start: 2024-06-19 | End: 2024-06-19

## 2024-06-19 RX ADMIN — LIDOCAINE HYDROCHLORIDE 100 MG: 20 INJECTION, SOLUTION EPIDURAL; INFILTRATION; INTRACAUDAL; PERINEURAL at 13:31

## 2024-06-19 RX ADMIN — PROPOFOL 200 MG: 10 INJECTION, EMULSION INTRAVENOUS at 13:31

## 2024-06-19 RX ADMIN — PROPOFOL 50 MG: 10 INJECTION, EMULSION INTRAVENOUS at 13:33

## 2024-06-19 RX ADMIN — SODIUM CHLORIDE, SODIUM LACTATE, POTASSIUM CHLORIDE, AND CALCIUM CHLORIDE 100 ML/HR: .6; .31; .03; .02 INJECTION, SOLUTION INTRAVENOUS at 13:09

## 2024-06-19 RX ADMIN — PROPOFOL 50 MG: 10 INJECTION, EMULSION INTRAVENOUS at 13:35

## 2024-06-19 RX ADMIN — SODIUM CHLORIDE, SODIUM LACTATE, POTASSIUM CHLORIDE, AND CALCIUM CHLORIDE: .6; .31; .03; .02 INJECTION, SOLUTION INTRAVENOUS at 13:29

## 2024-06-19 NOTE — ANESTHESIA PREPROCEDURE EVALUATION
Procedure:  EGD    Relevant Problems   GI/HEPATIC   (+) Hepatic steatosis      NEURO/PSYCH   (+) Chronic abdominal pain      Behavioral Health   (+) Autism spectrum disorder        Physical Exam    Airway    Mallampati score: II  TM Distance: >3 FB  Neck ROM: full     Dental       Cardiovascular      Pulmonary      Other Findings        Anesthesia Plan  ASA Score- 2     Anesthesia Type- IV sedation with anesthesia with ASA Monitors.         Additional Monitors:     Airway Plan:            Plan Factors-    Chart reviewed.                      Induction- intravenous.    Postoperative Plan-         Informed Consent- Anesthetic plan and risks discussed with patient.  I personally reviewed this patient with the CRNA. Discussed and agreed on the Anesthesia Plan with the CRNA..

## 2024-06-19 NOTE — ANESTHESIA POSTPROCEDURE EVALUATION
Post-Op Assessment Note    CV Status:  Stable    Pain management: satisfactory to patient       Mental Status:  Sleepy   Hydration Status:  Euvolemic   PONV Controlled:  Controlled   Airway Patency:  Patent     Post Op Vitals Reviewed: Yes    No anethesia notable event occurred.    Staff: CRNA               BP   128/80   Temp   97.3   Pulse  80   Resp   17   SpO2   99

## 2024-06-19 NOTE — H&P
"  [facility]  History & Physical     PATIENT INFO     Name: Anil Bailey  YOB: 1995   Age: 29 y.o.   Sex: male   MRN: [id number]     HISTORY OF PRESENT ILLNESS     Anil Bailey is a 29 y.o. year old male who presents for EGD for epigastric pain and belching.  No antithrombotics or anticoagulants.     REVIEW OF SYSTEMS     Per the HPI, and otherwise unremarkable.    Historical Information   Past Medical History:   Diagnosis Date    Autism     Hepatic steatosis     Sensory disorder      History reviewed. No pertinent surgical history.  Social History   Social History     Substance and Sexual Activity   Alcohol Use Not Currently     Social History     Substance and Sexual Activity   Drug Use Never     Social History     Tobacco Use   Smoking Status Never   Smokeless Tobacco Never     History reviewed. No pertinent family history.     MEDICATIONS & ALLERGIES     Current Outpatient Medications   Medication Instructions    ondansetron (ZOFRAN) 4 mg, Oral, Every 6 hours    pantoprazole (PROTONIX) 20 mg, Oral, Daily     No Known Allergies     PHYSICAL EXAM      Objective   Blood pressure 135/81, pulse 65, temperature 97.6 °F (36.4 °C), temperature source Temporal, resp. rate 20, height 6' 3\" (1.905 m), weight 113 kg (250 lb), SpO2 97%. Body mass index is 31.25 kg/m².    General Appearance:   Alert, cooperative, no distress   Lungs:   Equal chest rise, respirations unlabored    Heart:   Regular rate and rhythm   Abdomen:   Soft, non-tender, non-distended; normal bowel sounds; no masses, no organomegaly    Extremities:   No edema       ASSESSMENT & PLAN     This is a 29 y.o. year old male here for EGD, and he is stable and optimized for his procedure.      Francois Carmona D.O.  [facility]  Division of Gastroenterology & Hepatology  Available on TigerText  [email or web address]    ** Please Note: This note is constructed using a voice recognition " dictation system. **

## 2024-06-21 PROCEDURE — 88305 TISSUE EXAM BY PATHOLOGIST: CPT | Performed by: PATHOLOGY

## 2024-07-25 DIAGNOSIS — K29.70 GASTRITIS: ICD-10-CM

## 2024-07-25 RX ORDER — PANTOPRAZOLE SODIUM 20 MG/1
20 TABLET, DELAYED RELEASE ORAL DAILY
Qty: 100 TABLET | Refills: 1 | Status: SHIPPED | OUTPATIENT
Start: 2024-07-25

## 2024-09-10 ENCOUNTER — RA CDI HCC (OUTPATIENT)
Dept: OTHER | Facility: HOSPITAL | Age: 29
End: 2024-09-10

## 2024-09-19 ENCOUNTER — PATIENT MESSAGE (OUTPATIENT)
Dept: FAMILY MEDICINE CLINIC | Facility: CLINIC | Age: 29
End: 2024-09-19

## 2024-09-19 ENCOUNTER — OFFICE VISIT (OUTPATIENT)
Dept: FAMILY MEDICINE CLINIC | Facility: CLINIC | Age: 29
End: 2024-09-19
Payer: COMMERCIAL

## 2024-09-19 VITALS
OXYGEN SATURATION: 98 % | DIASTOLIC BLOOD PRESSURE: 83 MMHG | BODY MASS INDEX: 31.25 KG/M2 | WEIGHT: 250 LBS | SYSTOLIC BLOOD PRESSURE: 136 MMHG | HEART RATE: 80 BPM

## 2024-09-19 DIAGNOSIS — J06.9 VIRAL URI: Primary | ICD-10-CM

## 2024-09-19 PROCEDURE — 99212 OFFICE O/P EST SF 10 MIN: CPT | Performed by: FAMILY MEDICINE

## 2024-09-19 NOTE — PATIENT INSTRUCTIONS
Recommend treatment with over-the-counter and home remedies.  Discussed recommendation for nasal congestion using Sudafed.  For cough and chest congestion using Mucinex twice a day or honey.  For headaches, sinus pressure/pain can use Tylenol or ibuprofen.  For runny nose can use Flonase nasal spray, antihistamine nasal spray as well as Zyrtec/Allegra/Claritin.  Recommend hydration and adequate nutrition.

## 2024-09-19 NOTE — LETTER
September 19, 2024     Patient: Anil Bailey  YOB: 1995  Date of Visit: 9/19/2024      To Whom it May Concern:    Anil Bailey is under my professional care. Anil was seen in my office on 9/19/2024. Anil may return to work on 9/21/2024 if he is fever free for 24 hours .    If you have any questions or concerns, please don't hesitate to call.         Sincerely,          Dot Tellez,         CC: No Recipients

## 2024-09-19 NOTE — PROGRESS NOTES
Ambulatory Visit  Name: Anil Bailey      : 1995      MRN: [id number]  Encounter Provider: Dot Tellez DO  Encounter Date: 2024   Encounter department: [facility]    Assessment & Plan  Viral URI  Recommend treatment with over-the-counter and home remedies.  Discussed recommendation for nasal congestion using Sudafed.  For cough and chest congestion using Mucinex twice a day.  For headaches, sinus pressure/pain can use Tylenol or ibuprofen.  For runny nose can use Flonase nasal spray, antihistamine nasal spray as well as Zyrtec/Allegra/Claritin.  Recommend hydration and adequate nutrition.  Discussed the anticipated course of viral upper respiratory infection.  Patient to follow-up if symptoms worsen or do not improve as discussed.               Return if symptoms worsen or fail to improve.    History of Present Illness     HPI  Chief Complaint   Patient presents with    Follow-up      HA sore throat will need a doctor's note     Patient works as an EMT. Started feeling unwell yesterday. He worked yesterday and had to leave early because he started with some sore throat and headache. He did a Covid test at home which was negative.     History obtained from : patient  Review of Systems   Constitutional:  Positive for fatigue. Negative for activity change, appetite change, chills, fever and unexpected weight change.   HENT:  Positive for congestion, sinus pressure and sore throat. Negative for ear pain, postnasal drip, rhinorrhea, sinus pain and trouble swallowing.    Eyes:  Negative for visual disturbance.   Respiratory:  Negative for cough, chest tightness and shortness of breath.    Skin:  Negative for rash.   Neurological:  Positive for headaches. Negative for dizziness.     Current Outpatient Medications on File Prior to Visit   Medication Sig Dispense Refill    ondansetron (ZOFRAN) 4 mg tablet Take 1 tablet (4 mg total) by mouth every 6 (six) hours 12  tablet 0    pantoprazole (PROTONIX) 20 mg tablet TAKE ONE TABLET BY MOUTH ONCE DAILY 100 tablet 1     No current facility-administered medications on file prior to visit.      Social History     Tobacco Use    Smoking status: Never    Smokeless tobacco: Never   Vaping Use    Vaping status: Never Used   Substance and Sexual Activity    Alcohol use: Not Currently    Drug use: Never    Sexual activity: Not on file         Objective     /83 (BP Location: Right arm, Patient Position: Sitting, Cuff Size: Standard)   Pulse 80   Wt 113 kg (250 lb)   SpO2 98%   BMI 31.25 kg/m²     Physical Exam  Vitals reviewed.   Constitutional:       General: He is not in acute distress.     Appearance: He is not ill-appearing.   HENT:      Head: Normocephalic and atraumatic.      Right Ear: Tympanic membrane, ear canal and external ear normal. There is no impacted cerumen.      Left Ear: Tympanic membrane, ear canal and external ear normal. There is no impacted cerumen.      Nose: Congestion present. No rhinorrhea.      Mouth/Throat:      Mouth: Mucous membranes are moist.      Pharynx: Oropharynx is clear. Posterior oropharyngeal erythema present. No oropharyngeal exudate.   Eyes:      Extraocular Movements: Extraocular movements intact.      Conjunctiva/sclera: Conjunctivae normal.   Cardiovascular:      Rate and Rhythm: Normal rate and regular rhythm.      Heart sounds: Normal heart sounds.   Pulmonary:      Effort: Pulmonary effort is normal.      Breath sounds: Normal breath sounds.   Musculoskeletal:      Cervical back: Neck supple. No tenderness.   Lymphadenopathy:      Cervical: No cervical adenopathy.   Skin:     General: Skin is warm.   Neurological:      Mental Status: He is alert. Mental status is at baseline.   Psychiatric:         Mood and Affect: Mood normal.         Behavior: Behavior normal.

## 2024-11-18 ENCOUNTER — OFFICE VISIT (OUTPATIENT)
Dept: GASTROENTEROLOGY | Facility: CLINIC | Age: 29
End: 2024-11-18
Payer: COMMERCIAL

## 2024-11-18 VITALS
SYSTOLIC BLOOD PRESSURE: 116 MMHG | TEMPERATURE: 97.6 F | OXYGEN SATURATION: 97 % | BODY MASS INDEX: 32.28 KG/M2 | WEIGHT: 259.6 LBS | HEART RATE: 88 BPM | HEIGHT: 75 IN | DIASTOLIC BLOOD PRESSURE: 83 MMHG

## 2024-11-18 DIAGNOSIS — K76.0 HEPATIC STEATOSIS: ICD-10-CM

## 2024-11-18 DIAGNOSIS — R14.2 BELCHING: ICD-10-CM

## 2024-11-18 DIAGNOSIS — R10.13 EPIGASTRIC PAIN: ICD-10-CM

## 2024-11-18 DIAGNOSIS — K21.9 GASTROESOPHAGEAL REFLUX DISEASE WITHOUT ESOPHAGITIS: Primary | ICD-10-CM

## 2024-11-18 PROCEDURE — 99214 OFFICE O/P EST MOD 30 MIN: CPT | Performed by: PHYSICIAN ASSISTANT

## 2024-11-18 RX ORDER — PANTOPRAZOLE SODIUM 40 MG/1
40 TABLET, DELAYED RELEASE ORAL DAILY
Qty: 30 TABLET | Refills: 5 | Status: SHIPPED | OUTPATIENT
Start: 2024-11-18

## 2024-11-18 NOTE — PROGRESS NOTES
[facility] - Outpatient Follow-up Note  Anil Bailey 29 y.o. male MRN: [id number]  Encounter: [id number]    ASSESSMENT AND PLAN:      1. Gastroesophageal reflux disease without esophagitis (Primary)  2. Epigastric pain  3. Belching    Patient with hx of GI symptoms dating back to 2019.   Had EGD completed which commented on irregular Z-line, hiatal hernia.  Bx negative for intestinal metaplasia, h pylori infection.   Duodenal bx with focally increased intra-epithelial lymphocytes without significant villous blunting.  His celiac serologies were negative.  Less suspicious for celiac disease and more so that perhaps this was secondary to infection, BMI, NSAIDs, etc.     Continue small frequent meals and diet and lifestyle modifications for GERD.  Avoid trigger foods and try to focus on healthy dietary intake.  Escalate PPI therapy now to 40 mg dosing.    Consider additional testing in the future if symptoms persist, consider SIBO testing, gastric emptying study, etc. Okay for simethicone PRN.     - pantoprazole (PROTONIX) 40 mg tablet; Take 1 tablet (40 mg total) by mouth daily  Dispense: 30 tablet; Refill: 5    4. Hepatic steatosis     Suspect metabolic etiol.   Elastography with S2, F0-F1.   Pt is aware of sequelae of disease.     Discussed recommendations in regards to fatty liver including:   Strict control of contributing comorbidities (obesity, prediabetes/diabetes, hypertension, and hypertriglyceridemia).  Weight loss of approx 10-15% of patient's current body weight over a period of 6-12 months through low fat diet and cardiovascular exercise as tolerated.  Limiting alcohol consumption, preferably complete abstinence.  Monitor hepatic function every 6 months with routine labs.     We will follow up in 3-6 months to reassess symptoms.   ______________________________________________________________________    SUBJECTIVE: Patient is a 29 y.o. male who presents today for  follow-up regarding EGD. Pmhx sig for hepatic steatosis, ASD, BMI 31.     Patient was initially evaluated in 06/2024.  He was complaining of waxing and waning upper abdominal pain and excess belching since 2019.  He was having symptoms despite pharmacotherapy.  He had an EGD completed which commented on hiatal hernia and some duodenitis.    11/18/24:     Pt is still getting some upper abd pain, once every two weeks or so. Still having some excess belching. No nausea/emesis. No dysphagia or odynophagia. Working on diet though still having dietary indiscretion.     Having formed brown BM. Forgot about fiber supplementation but feels he doesn't need this at present. No constipation or diarrhea, no abd pain or rectal pain related to defecation. No nocturnal BM. No family hx of CRC.     04/2024: CT A/P: No evidence of acute abdominopelvic process.   04/2024: Hb 16.0, MCV 89, Plt 274, BUN 17, Cr 0.93, AST 17, ALT 25, ALP 57 albumin 4.9, lipase 23  06/2024: Ttg IgA <2, IgA 134  06/2024: US: hepatic steatosis   06/2024: Elastography: S2, F0-F1     NSAIDs: PRN   Etoh: rare   Tobacco: none   Cannabis: none      Endoscopic history:   EGD: 06/2024: 3 cm type I hiatal hernia; irregular z-line  A. Stomach, Bx. R/O H. Pylori: Gastric mucosa with no significant histopathologic abnormality. Negative for H. pylori by routine H&E. Negative for malignancy.   B. Duodenum, Bx. R/O Celiac Disease: Small bowel mucosa with focally increased intraepithelial lymphocytes without significant villous blunting. Negative for acute inflammation and malignancy.  Comment: Focally, there are >40 lymphocytes/100 enterocytes. Increased intraepithelial lymphocytes is a nonspecific finding that may be seen in a number of conditions, including infections (e.g. Helicobacter gastritis), obesity, and autoimmune disorders.  It is seen in celiac disease; correlation with serology and diet history is suggested.    Review of Systems   Constitutional:  Negative  "for fever.   HENT:  Negative for trouble swallowing.    Gastrointestinal:  Positive for abdominal pain and nausea. Negative for constipation, diarrhea and vomiting.   Genitourinary:  Negative for dysuria, frequency and hematuria.   Musculoskeletal:  Negative for arthralgias and myalgias.   Neurological:  Negative for seizures, syncope and headaches.   Otherwise Per HPI    Historical Information   Past Medical History:   Diagnosis Date    Autism     Hepatic steatosis     Sensory disorder      History reviewed. No pertinent surgical history.  Social History   Social History     Substance and Sexual Activity   Alcohol Use Not Currently     Social History     Substance and Sexual Activity   Drug Use Never     Social History     Tobacco Use   Smoking Status Never   Smokeless Tobacco Never     History reviewed. No pertinent family history.    Meds/Allergies       Current Outpatient Medications:     ondansetron (ZOFRAN) 4 mg tablet    pantoprazole (PROTONIX) 20 mg tablet    No Known Allergies    Objective     Blood pressure 116/83, pulse 88, temperature 97.6 °F (36.4 °C), temperature source Temporal, height 6' 3\" (1.905 m), weight 118 kg (259 lb 9.6 oz), SpO2 97%. Body mass index is 32.45 kg/m².    Physical Exam  Vitals and nursing note reviewed.   Constitutional:       General: He is not in acute distress.     Appearance: He is well-developed.   HENT:      Head: Normocephalic and atraumatic.   Eyes:      General: No scleral icterus.     Conjunctiva/sclera: Conjunctivae normal.   Cardiovascular:      Rate and Rhythm: Normal rate.   Pulmonary:      Effort: Pulmonary effort is normal. No respiratory distress.   Abdominal:      General: Bowel sounds are normal. There is no distension.      Palpations: Abdomen is soft.      Tenderness: There is no abdominal tenderness. There is no guarding.   Skin:     General: Skin is warm and dry.      Coloration: Skin is not jaundiced.   Neurological:      General: No focal deficit present. "      Mental Status: He is alert.   Psychiatric:         Mood and Affect: Mood normal.       Lab Results:   No visits with results within 1 Day(s) from this visit.   Latest known visit with results is:   Hospital Outpatient Visit on 06/19/2024   Component Date Value    Case Report 06/19/2024                      Value:Surgical Pathology Report                         Case: [id number]                                  Authorizing Provider:  Francois Carmona DO              Collected:           06/19/2024 1331              Ordering Location:     [facility] Received:            06/19/2024 1435                                     Operating Room                                                               Pathologist:           Pramod Malik MD                                                                 Specimens:   A) - Stomach, Cold Forcep Bx. R/O H. Pylori                                                         B) - Duodenum, Cold Forcep Bx. R/O Celiac Disease                                          Final Diagnosis 06/19/2024                      Value:A. Stomach, Bx. R/O H. Pylori:  - Gastric mucosa with no significant histopathologic abnormality.  - Negative for H. pylori by routine H&E.  - Negative for malignancy.     B. Duodenum, Bx. R/O Celiac Disease:  - Small bowel mucosa with focally increased intraepithelial lymphocytes without significant villous blunting.  See comment.  - Negative for acute inflammation and malignancy.    Comment: Focally, there are >40 lymphocytes/100 enterocytes. Increased intraepithelial lymphocytes is a nonspecific finding that may be seen in a number of conditions, including infections (e.g. Helicobacter gastritis), obesity, and autoimmune disorders.  It is seen in celiac disease; correlation with serology and diet history is suggested.      Additional Information 06/19/2024    "                   Value:All reported additional testing was performed with appropriately reactive controls.  These tests were developed and their performance characteristics determined by [facility] or appropriate performing facility, though some tests may be performed on tissues which have not been validated for performance characteristics (such as staining performed on alcohol exposed cell blocks and decalcified tissues).  Results should be interpreted with caution and in the context of the patients’ clinical condition. These tests may not be cleared or approved by the U.S. Food and Drug Administration, though the FDA has determined that such clearance or approval is not necessary. These tests are used for clinical purposes and they should not be regarded as investigational or for research. This laboratory has been approved by [facility], designated as a high-complexity laboratory and is qualified to perform these tests.  .  Interpretation performed at [facility], [facility] [address]      Gross Description 06/19/2024                      Value:A. The specimen is received in formalin, labeled with the patient's name and hospital number, and is designated \" stomach\".  The specimen consists of 4 tan soft tissue fragments ranging from less than 0.1 to 0.6 cm in greatest dimension.  Entirely submitted. One screened cassette.  B. The specimen is received in formalin, labeled with the patient's name and hospital number, and is designated \" duodenum\".  The specimen consists of 3 tan soft tissue fragments ranging from 0.3 to 0.5 cm in greatest dimension.  Entirely submitted. One screened cassette.    Note: The estimated total formalin fixation time based upon information provided by the submitting clinician and the standard processing schedule is under 72 hours. AKemmerer         Radiology Results:   No results found.    Milagro Jacobs PA-C    **Please note: "  Dictation voice to text software may have been used in the creation of this record.  Occasional wrong word or “sound alike” substitutions may have occurred due to the inherent limitations of voice recognition software.  Read the chart carefully and recognize, using context, where substitutions have occurred.**

## 2024-11-18 NOTE — PATIENT INSTRUCTIONS
Increase pantoprazole to 40 mg every morning.   Work on small frequent meals and avoiding spicy, fatty/greasy, citrus, eating close to bed, caffeine, chocolate and alcohol.     If you have breakthrough symptoms, you can reach for pepcid/famotidine 20mg twice daily.     If symptoms do not improve, consider gastric emptying study and SIBO testing for additional evaluation. Consider gallbladder work up.

## 2025-02-26 ENCOUNTER — TELEPHONE (OUTPATIENT)
Dept: GASTROENTEROLOGY | Facility: CLINIC | Age: 30
End: 2025-02-26

## 2025-02-26 NOTE — TELEPHONE ENCOUNTER
We apologize, but we need to reschedule your follow up appointment with Milagro Jacobs on 4/24/25. Please call us for a new appointment.

## 2025-03-05 ENCOUNTER — TELEPHONE (OUTPATIENT)
Age: 30
End: 2025-03-05

## 2025-03-05 DIAGNOSIS — K21.9 GASTROESOPHAGEAL REFLUX DISEASE WITHOUT ESOPHAGITIS: ICD-10-CM

## 2025-03-05 RX ORDER — PANTOPRAZOLE SODIUM 40 MG/1
40 TABLET, DELAYED RELEASE ORAL DAILY
Qty: 90 TABLET | Refills: 3 | Status: SHIPPED | OUTPATIENT
Start: 2025-03-05

## 2025-03-05 NOTE — TELEPHONE ENCOUNTER
Patients GI provider:  ROSIBEL Jacobs     Number to return call: [phone]    Reason for call: Pt calling to have his   pantoprazole (PROTONIX) 40 mg tablet  refilled to [facility] mail order. Please send in the refill if appropriate    Scheduled procedure/appointment date if applicable: Apt/ 8/15/25

## 2025-03-05 NOTE — TELEPHONE ENCOUNTER
LOV 11/18/24, currently scheduled routine follow up 8/15/25/ Requesting refill on pantoprazole. Order attached please sign off to release to pharmacy if approved, placed for 90 day with one refill since mail order..

## 2025-04-25 RX ORDER — ALUMINUM HYDROXIDE AND MAGNESIUM TRISILICATE 80; 14.2 MG/1; MG/1
TABLET, CHEWABLE ORAL
COMMUNITY

## 2025-04-29 ENCOUNTER — OFFICE VISIT (OUTPATIENT)
Dept: FAMILY MEDICINE CLINIC | Facility: CLINIC | Age: 30
End: 2025-04-29
Payer: COMMERCIAL

## 2025-04-29 VITALS
WEIGHT: 251.54 LBS | DIASTOLIC BLOOD PRESSURE: 75 MMHG | OXYGEN SATURATION: 98 % | SYSTOLIC BLOOD PRESSURE: 125 MMHG | BODY MASS INDEX: 31.44 KG/M2 | HEART RATE: 83 BPM

## 2025-04-29 DIAGNOSIS — K76.0 HEPATIC STEATOSIS: ICD-10-CM

## 2025-04-29 DIAGNOSIS — F84.0 AUTISM SPECTRUM DISORDER: ICD-10-CM

## 2025-04-29 DIAGNOSIS — R03.0 ELEVATED BLOOD PRESSURE READING IN OFFICE WITHOUT DIAGNOSIS OF HYPERTENSION: ICD-10-CM

## 2025-04-29 DIAGNOSIS — Z00.00 ANNUAL PHYSICAL EXAM: Primary | ICD-10-CM

## 2025-04-29 PROCEDURE — 99395 PREV VISIT EST AGE 18-39: CPT | Performed by: FAMILY MEDICINE

## 2025-04-29 PROCEDURE — 99214 OFFICE O/P EST MOD 30 MIN: CPT | Performed by: FAMILY MEDICINE

## 2025-04-29 RX ORDER — LISINOPRIL 10 MG/1
10 TABLET ORAL DAILY
Qty: 90 TABLET | Refills: 1 | Status: CANCELLED | OUTPATIENT
Start: 2025-04-29

## 2025-04-29 NOTE — ASSESSMENT & PLAN NOTE
Dx in 2001. He has never required therapy or medications but admits in the past year or so he has increased senses of feeling overwhelmed by noises around him. Feeling overstimulated.     We discussed initiating regular therapy. He tried this but could no go consistently and is not interested at this time. He is interested in seeing psych for autism and concern for ADHD. Referral placed     Orders:    Ambulatory referral to Psych Services; Future

## 2025-04-29 NOTE — PROGRESS NOTES
Adult Annual Physical  Name: Anil Bailey      : 1995      MRN: [id number]  Encounter Provider: Dot Jiménez DO  Encounter Date: 2025   Encounter department: [facility]    :  Assessment & Plan  Annual physical exam    Orders:    CBC and differential; Future    Comprehensive metabolic panel; Future    Lipid panel; Future    Hepatic steatosis  Abd US 2019: Fatty/fibrotic change of the liver   Discussed importance of healthy diet and exercise  Prior labs reveal hepatitis B immunity  Advised alcohol cessation - currently drinks 6-8 per week on average  Now following with GI as well  Continue to monitor    Orders:    CBC and differential; Future    Comprehensive metabolic panel; Future    Lipid panel; Future    Autism spectrum disorder  Dx in . He has never required therapy or medications but admits in the past year or so he has increased senses of feeling overwhelmed by noises around him. Feeling overstimulated.     We discussed initiating regular therapy. He tried this but could no go consistently and is not interested at this time. He is interested in seeing psych for autism and concern for ADHD. Referral placed     Orders:    Ambulatory referral to Psych Services; Future    Elevated blood pressure reading in office without diagnosis of hypertension  Current BP was initially 142/96, recheck 125/75   Recommend home BP monitoring and low sodium diet - handout provided  Would recommend ACEi if continues to be high   Continue to monitor    Orders:    CBC and differential; Future    Comprehensive metabolic panel; Future    Lipid panel; Future        Preventive Screenings:  - Diabetes Screening: risks/benefits discussed and orders placed  - Cholesterol Screening: risks/benefits discussed and orders placed   - Hepatitis C screening: screening up-to-date   - HIV screening: screening up-to-date   - Colon cancer screening: screening not indicated   - Lung cancer  screening: screening not indicated   - Prostate cancer screening: screening not indicated     Counseling/Anticipatory Guidance:    - Diet: discussed recommendations for a healthy/well-balanced diet.   - Exercise: the importance of regular exercise/physical activity was discussed. Recommend exercise 3-5 times per week for at least 30 minutes.       Depression Screening and Follow-up Plan: Patient was screened for depression during today's encounter. They screened negative with a PHQ-2 score of 2.          Return in about 1 year (around 4/29/2026) for Annual physical.      History of Present Illness       Chief Complaint   Patient presents with    Physical Exam       Adult Annual Physical:  Patient presents for annual physical.     Diet and Physical Activity:  - Diet/Nutrition: well balanced diet and no special diet.  - Exercise: walking, 3-4 times a week on average and 30-60 minutes on average.    Depression Screening:  - PHQ-2 Score: 2    General Health:  - Sleep:. 6 on average  - Hearing: normal hearing bilateral ears.  - Vision: most recent eye exam < 1 year ago.  - Dental: regular dental visits.     Health:  - History of STDs: no.   - Urinary symptoms: none.       Taking melatonin gummies  He also started taking a de-stress herbal supplement     Review of Systems   Constitutional:  Negative for activity change, appetite change, chills, diaphoresis, fatigue, fever and unexpected weight change.   HENT:  Negative for congestion, rhinorrhea, sore throat and trouble swallowing.    Eyes:  Negative for visual disturbance.   Respiratory:  Negative for cough, choking and shortness of breath.    Cardiovascular:  Negative for chest pain, palpitations and leg swelling.   Gastrointestinal:  Negative for abdominal pain, constipation, diarrhea, nausea and vomiting.   Genitourinary:  Negative for difficulty urinating, dysuria and frequency.   Musculoskeletal:  Negative for arthralgias, joint swelling and myalgias.   Skin:   Negative for color change and rash.   Allergic/Immunologic: Negative for environmental allergies and food allergies.   Neurological:  Negative for dizziness, light-headedness and headaches.   Psychiatric/Behavioral:  Negative for dysphoric mood. The patient is not nervous/anxious.          Current Outpatient Medications on File Prior to Visit   Medication Sig Dispense Refill    Alum Hydroxide-Mag Trisilicate (Gaviscon) 80-14.2 MG CHEW Chew      ondansetron (ZOFRAN) 4 mg tablet Take 1 tablet (4 mg total) by mouth every 6 (six) hours 12 tablet 0    pantoprazole (PROTONIX) 40 mg tablet Take 1 tablet (40 mg total) by mouth daily 90 tablet 3     No current facility-administered medications on file prior to visit.      Social History     Tobacco Use    Smoking status: Never    Smokeless tobacco: Never   Vaping Use    Vaping status: Never Used   Substance and Sexual Activity    Alcohol use: Not Currently    Drug use: Never    Sexual activity: Not on file       Objective   /75 (BP Location: Right arm, Patient Position: Sitting, Cuff Size: Standard)   Pulse 83   Wt 114 kg (251 lb 8.7 oz)   SpO2 98%   BMI 31.44 kg/m²     Physical Exam  Vitals reviewed.   Constitutional:       General: He is not in acute distress.     Appearance: Normal appearance. He is normal weight. He is not ill-appearing.   HENT:      Head: Normocephalic and atraumatic.      Right Ear: Tympanic membrane, ear canal and external ear normal.      Left Ear: Tympanic membrane, ear canal and external ear normal.      Nose: Nose normal. No congestion or rhinorrhea.      Mouth/Throat:      Mouth: Mucous membranes are moist.      Pharynx: Oropharynx is clear. No oropharyngeal exudate or posterior oropharyngeal erythema.   Eyes:      General: No scleral icterus.     Extraocular Movements: Extraocular movements intact.      Conjunctiva/sclera: Conjunctivae normal.      Pupils: Pupils are equal, round, and reactive to light.   Cardiovascular:      Rate and  Rhythm: Normal rate and regular rhythm.      Pulses: Normal pulses.      Heart sounds: Normal heart sounds. No murmur heard.     No friction rub.   Pulmonary:      Effort: Pulmonary effort is normal. No respiratory distress.      Breath sounds: Normal breath sounds. No wheezing, rhonchi or rales.   Abdominal:      General: Abdomen is flat. Bowel sounds are normal. There is no distension.      Palpations: Abdomen is soft.      Tenderness: There is no abdominal tenderness. There is no guarding.   Musculoskeletal:         General: No swelling or tenderness. Normal range of motion.      Cervical back: Normal range of motion and neck supple. No rigidity. No muscular tenderness.      Right lower leg: No edema.      Left lower leg: No edema.   Lymphadenopathy:      Cervical: No cervical adenopathy.   Skin:     General: Skin is warm and dry.      Capillary Refill: Capillary refill takes less than 2 seconds.   Neurological:      General: No focal deficit present.      Mental Status: He is alert.   Psychiatric:         Mood and Affect: Mood normal.         Behavior: Behavior normal.

## 2025-04-29 NOTE — ASSESSMENT & PLAN NOTE
Current BP was initially 142/96, recheck 125/75   Recommend home BP monitoring and low sodium diet - handout provided  Would recommend ACEi if continues to be high   Continue to monitor    Orders:    CBC and differential; Future    Comprehensive metabolic panel; Future    Lipid panel; Future

## 2025-04-29 NOTE — ASSESSMENT & PLAN NOTE
Abd US 2019: Fatty/fibrotic change of the liver   Discussed importance of healthy diet and exercise  Prior labs reveal hepatitis B immunity  Advised alcohol cessation - currently drinks 6-8 per week on average  Now following with GI as well  Continue to monitor    Orders:    CBC and differential; Future    Comprehensive metabolic panel; Future    Lipid panel; Future

## 2025-05-02 ENCOUNTER — APPOINTMENT (OUTPATIENT)
Dept: LAB | Facility: CLINIC | Age: 30
End: 2025-05-02
Payer: COMMERCIAL

## 2025-05-02 DIAGNOSIS — Z00.00 ANNUAL PHYSICAL EXAM: ICD-10-CM

## 2025-05-02 DIAGNOSIS — K76.0 HEPATIC STEATOSIS: ICD-10-CM

## 2025-05-02 DIAGNOSIS — R03.0 ELEVATED BLOOD PRESSURE READING IN OFFICE WITHOUT DIAGNOSIS OF HYPERTENSION: ICD-10-CM

## 2025-05-02 LAB
ALBUMIN SERPL BCG-MCNC: 4.6 G/DL (ref 3.5–5)
ALP SERPL-CCNC: 50 U/L (ref 34–104)
ALT SERPL W P-5'-P-CCNC: 33 U/L (ref 7–52)
ANION GAP SERPL CALCULATED.3IONS-SCNC: 7 MMOL/L (ref 4–13)
AST SERPL W P-5'-P-CCNC: 21 U/L (ref 13–39)
BASOPHILS # BLD AUTO: 0.05 THOUSANDS/ÂΜL (ref 0–0.1)
BASOPHILS NFR BLD AUTO: 1 % (ref 0–1)
BILIRUB SERPL-MCNC: 0.44 MG/DL (ref 0.2–1)
BUN SERPL-MCNC: 19 MG/DL (ref 5–25)
CALCIUM SERPL-MCNC: 9 MG/DL (ref 8.4–10.2)
CHLORIDE SERPL-SCNC: 104 MMOL/L (ref 96–108)
CHOLEST SERPL-MCNC: 163 MG/DL (ref ?–200)
CO2 SERPL-SCNC: 27 MMOL/L (ref 21–32)
CREAT SERPL-MCNC: 0.88 MG/DL (ref 0.6–1.3)
EOSINOPHIL # BLD AUTO: 0.11 THOUSAND/ÂΜL (ref 0–0.61)
EOSINOPHIL NFR BLD AUTO: 2 % (ref 0–6)
ERYTHROCYTE [DISTWIDTH] IN BLOOD BY AUTOMATED COUNT: 12.3 % (ref 11.6–15.1)
GFR SERPL CREATININE-BSD FRML MDRD: 116 ML/MIN/1.73SQ M
GLUCOSE P FAST SERPL-MCNC: 83 MG/DL (ref 65–99)
HCT VFR BLD AUTO: 45.9 % (ref 36.5–49.3)
HDLC SERPL-MCNC: 36 MG/DL
HGB BLD-MCNC: 15.1 G/DL (ref 12–17)
IMM GRANULOCYTES # BLD AUTO: 0.03 THOUSAND/UL (ref 0–0.2)
IMM GRANULOCYTES NFR BLD AUTO: 0 % (ref 0–2)
LDLC SERPL CALC-MCNC: 103 MG/DL (ref 0–100)
LYMPHOCYTES # BLD AUTO: 2.33 THOUSANDS/ÂΜL (ref 0.6–4.47)
LYMPHOCYTES NFR BLD AUTO: 34 % (ref 14–44)
MCH RBC QN AUTO: 30 PG (ref 26.8–34.3)
MCHC RBC AUTO-ENTMCNC: 32.9 G/DL (ref 31.4–37.4)
MCV RBC AUTO: 91 FL (ref 82–98)
MONOCYTES # BLD AUTO: 0.5 THOUSAND/ÂΜL (ref 0.17–1.22)
MONOCYTES NFR BLD AUTO: 7 % (ref 4–12)
NEUTROPHILS # BLD AUTO: 3.88 THOUSANDS/ÂΜL (ref 1.85–7.62)
NEUTS SEG NFR BLD AUTO: 56 % (ref 43–75)
NONHDLC SERPL-MCNC: 127 MG/DL
NRBC BLD AUTO-RTO: 0 /100 WBCS
PLATELET # BLD AUTO: 255 THOUSANDS/UL (ref 149–390)
PMV BLD AUTO: 10.3 FL (ref 8.9–12.7)
POTASSIUM SERPL-SCNC: 3.9 MMOL/L (ref 3.5–5.3)
PROT SERPL-MCNC: 6.9 G/DL (ref 6.4–8.4)
RBC # BLD AUTO: 5.03 MILLION/UL (ref 3.88–5.62)
SODIUM SERPL-SCNC: 138 MMOL/L (ref 135–147)
TRIGL SERPL-MCNC: 119 MG/DL (ref ?–150)
WBC # BLD AUTO: 6.9 THOUSAND/UL (ref 4.31–10.16)

## 2025-05-02 PROCEDURE — 85025 COMPLETE CBC W/AUTO DIFF WBC: CPT

## 2025-05-02 PROCEDURE — 80053 COMPREHEN METABOLIC PANEL: CPT

## 2025-05-02 PROCEDURE — 80061 LIPID PANEL: CPT

## 2025-05-02 PROCEDURE — 36415 COLL VENOUS BLD VENIPUNCTURE: CPT

## 2025-05-05 ENCOUNTER — RESULTS FOLLOW-UP (OUTPATIENT)
Dept: FAMILY MEDICINE CLINIC | Facility: CLINIC | Age: 30
End: 2025-05-05

## 2025-07-01 ENCOUNTER — HOSPITAL ENCOUNTER (EMERGENCY)
Facility: HOSPITAL | Age: 30
Discharge: HOME | End: 2025-07-01
Attending: EMERGENCY MEDICINE
Payer: COMMERCIAL

## 2025-07-01 ENCOUNTER — APPOINTMENT (EMERGENCY)
Dept: RADIOLOGY | Facility: HOSPITAL | Age: 30
End: 2025-07-01
Payer: COMMERCIAL

## 2025-07-01 VITALS
TEMPERATURE: 97.9 F | DIASTOLIC BLOOD PRESSURE: 80 MMHG | WEIGHT: 294.53 LBS | OXYGEN SATURATION: 96 % | SYSTOLIC BLOOD PRESSURE: 134 MMHG | RESPIRATION RATE: 16 BRPM | BODY MASS INDEX: 36.81 KG/M2 | HEART RATE: 77 BPM

## 2025-07-01 DIAGNOSIS — M54.50 ACUTE LOW BACK PAIN: Primary | ICD-10-CM

## 2025-07-01 DIAGNOSIS — T14.8XXA MUSCLE STRAIN: ICD-10-CM

## 2025-07-01 PROCEDURE — 99284 EMERGENCY DEPT VISIT MOD MDM: CPT | Performed by: EMERGENCY MEDICINE

## 2025-07-01 PROCEDURE — 72100 X-RAY EXAM L-S SPINE 2/3 VWS: CPT

## 2025-07-01 PROCEDURE — 99283 EMERGENCY DEPT VISIT LOW MDM: CPT

## 2025-07-01 RX ORDER — IBUPROFEN 400 MG/1
800 TABLET, FILM COATED ORAL ONCE
Status: COMPLETED | OUTPATIENT
Start: 2025-07-01 | End: 2025-07-01

## 2025-07-01 RX ORDER — PREDNISONE 20 MG/1
40 TABLET ORAL DAILY
Qty: 8 TABLET | Refills: 0 | Status: SHIPPED | OUTPATIENT
Start: 2025-07-01 | End: 2025-07-05

## 2025-07-01 RX ORDER — CYCLOBENZAPRINE HCL 10 MG
10 TABLET ORAL 2 TIMES DAILY PRN
Qty: 20 TABLET | Refills: 0 | Status: SHIPPED | OUTPATIENT
Start: 2025-07-01

## 2025-07-01 RX ORDER — IBUPROFEN 800 MG/1
800 TABLET, FILM COATED ORAL 3 TIMES DAILY
Qty: 21 TABLET | Refills: 0 | Status: SHIPPED | OUTPATIENT
Start: 2025-07-01

## 2025-07-01 RX ORDER — PREDNISONE 20 MG/1
60 TABLET ORAL ONCE
Status: COMPLETED | OUTPATIENT
Start: 2025-07-01 | End: 2025-07-01

## 2025-07-01 RX ADMIN — PREDNISONE 60 MG: 20 TABLET ORAL at 07:50

## 2025-07-01 RX ADMIN — IBUPROFEN 800 MG: 400 TABLET, FILM COATED ORAL at 07:51

## 2025-07-01 NOTE — Clinical Note
Anil Bailey was seen and treated in our emergency department on 7/1/2025.                Diagnosis:     Anil  is off the rest of the shift today, may return to work on return date.    He may return on this date: 07/03/2025         If you have any questions or concerns, please don't hesitate to call.      Leida Wing, DO    ______________________________           _______________          _______________  Hospital Representative                              Date                                Time

## 2025-07-01 NOTE — ED PROVIDER NOTES
"Time reflects when diagnosis was documented in both MDM as applicable and the Disposition within this note       Time User Action Codes Description Comment    7/1/2025  8:20 AM Leida Wing [M54.50] Acute low back pain     7/1/2025  8:20 AM Leida Wing [T14.8XXA] Muscle strain           ED Disposition       ED Disposition   Discharge    Condition   Stable    Date/Time   Tue Jul 1, 2025  8:20 AM    Comment   Anil Bailey discharge to home/self care.                   Assessment & Plan       Medical Decision Making  Ddx: strain, sprain, sciatica, muscle spasm    Amount and/or Complexity of Data Reviewed  Radiology: ordered and independent interpretation performed.    Risk  Prescription drug management.             Medications   ibuprofen (MOTRIN) tablet 800 mg (800 mg Oral Given 7/1/25 0751)   predniSONE tablet 60 mg (60 mg Oral Given 7/1/25 0750)       ED Risk Strat Scores                    No data recorded        SBIRT 20yo+      Flowsheet Row Most Recent Value   Initial Alcohol Screen: US AUDIT-C     1. How often do you have a drink containing alcohol? 2 Filed at: 07/01/2025 0656   2. How many drinks containing alcohol do you have on a typical day you are drinking?  1 Filed at: 07/01/2025 0656   3a. Male UNDER 65: How often do you have five or more drinks on one occasion? 1 Filed at: 07/01/2025 0656   Audit-C Score 4 Filed at: 07/01/2025 0656   GONZÁLEZ: How many times in the past year have you...    Used an illegal drug or used a prescription medication for non-medical reasons? Never Filed at: 07/01/2025 0656                            History of Present Illness       Chief Complaint   Patient presents with    Back Pain     Patient presents to the ED with complaints of lower back pain that began yesterday. He states he may have \"overdone it at the gym.\" Denies any injuries.        Past Medical History[1]   Past Surgical History[2]   Family History[3]   Social History[4]   E-Cigarette/Vaping    " E-Cigarette Use Never User       E-Cigarette/Vaping Substances      I have reviewed and agree with the history as documented.     30-year-old male presented to the ED for evaluation of low back pain that began yesterday.  Patient states that he was running yesterday at the treadmill as well as on the elliptical and thinks he may have overdone it.  Patient also states that he lifts heavy at times and was told previously that he has arthritis in his back.  Denies any urine or bowel incontinence.  His pain is worse with movement and localized to his low back.  He has no UTI symptoms or testicular pain.        Review of Systems   Constitutional:  Negative for chills and fever.   HENT:  Negative for ear pain and sore throat.    Eyes:  Negative for pain and visual disturbance.   Respiratory:  Negative for cough and shortness of breath.    Cardiovascular:  Negative for chest pain and palpitations.   Gastrointestinal:  Negative for abdominal pain and vomiting.   Genitourinary:  Negative for dysuria and hematuria.   Musculoskeletal:  Positive for back pain. Negative for arthralgias.   Skin:  Negative for color change and rash.   Neurological:  Negative for seizures and syncope.   All other systems reviewed and are negative.          Objective       ED Triage Vitals [07/01/25 0655]   Temperature Pulse Blood Pressure Respirations SpO2 Patient Position - Orthostatic VS   97.9 °F (36.6 °C) 89 137/94 18 97 % Sitting      Temp Source Heart Rate Source BP Location FiO2 (%) Pain Score    Temporal Monitor Left arm -- 7      Vitals      Date and Time Temp Pulse SpO2 Resp BP Pain Score FACES Pain Rating User   07/01/25 0815 -- 77 96 % -- -- -- -- MB   07/01/25 0800 -- 82 96 % 16 134/80 -- -- MB   07/01/25 0751 -- -- -- -- -- 7 -- KM   07/01/25 0655 97.9 °F (36.6 °C) 89 97 % 18 137/94 7 -- RR            Physical Exam  Vitals and nursing note reviewed.   Constitutional:       General: He is in acute distress.      Appearance: Normal  appearance. He is well-developed and normal weight.   HENT:      Head: Normocephalic and atraumatic.     Eyes:      Conjunctiva/sclera: Conjunctivae normal.       Cardiovascular:      Rate and Rhythm: Normal rate and regular rhythm.      Heart sounds: No murmur heard.  Pulmonary:      Effort: Pulmonary effort is normal. No respiratory distress.      Breath sounds: Normal breath sounds.   Abdominal:      Palpations: Abdomen is soft.      Tenderness: There is no abdominal tenderness.     Musculoskeletal:         General: Tenderness present. No swelling, deformity or signs of injury.      Cervical back: Normal range of motion and neck supple.      Right lower leg: No edema.      Left lower leg: No edema.      Comments: No midline tenderness     Skin:     General: Skin is warm and dry.      Capillary Refill: Capillary refill takes less than 2 seconds.     Neurological:      General: No focal deficit present.      Mental Status: He is alert and oriented to person, place, and time. Mental status is at baseline.     Psychiatric:         Mood and Affect: Mood normal.         Results Reviewed       None            XR spine lumbar 2 or 3 views injury   ED Interpretation by Leida Wing DO (07/01 0819)   +no fx          Procedures    ED Medication and Procedure Management   Prior to Admission Medications   Prescriptions Last Dose Informant Patient Reported? Taking?   Alum Hydroxide-Mag Trisilicate (Gaviscon) 80-14.2 MG CHEW   Yes No   Sig: Chew   ondansetron (ZOFRAN) 4 mg tablet   No No   Sig: Take 1 tablet (4 mg total) by mouth every 6 (six) hours   pantoprazole (PROTONIX) 40 mg tablet   No No   Sig: Take 1 tablet (40 mg total) by mouth daily      Facility-Administered Medications: None     Patient's Medications   Discharge Prescriptions    CYCLOBENZAPRINE (FLEXERIL) 10 MG TABLET    Take 1 tablet (10 mg total) by mouth 2 (two) times a day as needed for muscle spasms       Start Date: 7/1/2025  End Date: --        Order Dose: 10 mg       Quantity: 20 tablet    Refills: 0    IBUPROFEN (MOTRIN) 800 MG TABLET    Take 1 tablet (800 mg total) by mouth 3 (three) times a day       Start Date: 7/1/2025  End Date: --       Order Dose: 800 mg       Quantity: 21 tablet    Refills: 0    PREDNISONE 20 MG TABLET    Take 2 tablets (40 mg total) by mouth daily for 4 days       Start Date: 7/1/2025  End Date: 7/5/2025       Order Dose: 40 mg       Quantity: 8 tablet    Refills: 0     No discharge procedures on file.  ED SEPSIS DOCUMENTATION   Time reflects when diagnosis was documented in both MDM as applicable and the Disposition within this note       Time User Action Codes Description Comment    7/1/2025  8:20 AM Leida Wing [M54.50] Acute low back pain     7/1/2025  8:20 AM Leida Wing [T14.8XXA] Muscle strain                  Leida Wing DO  07/01/25 0826         [1]   Past Medical History:  Diagnosis Date    Autism     Hepatic steatosis     Sensory disorder    [2] No past surgical history on file.  [3] No family history on file.  [4]   Social History  Tobacco Use    Smoking status: Never    Smokeless tobacco: Never   Vaping Use    Vaping status: Never Used   Substance Use Topics    Alcohol use: Not Currently    Drug use: Never        Leida Wing DO  07/01/25 0829

## 2025-07-03 ENCOUNTER — OFFICE VISIT (OUTPATIENT)
Dept: FAMILY MEDICINE CLINIC | Facility: CLINIC | Age: 30
End: 2025-07-03
Payer: COMMERCIAL

## 2025-07-03 ENCOUNTER — NURSE TRIAGE (OUTPATIENT)
Dept: PHYSICAL THERAPY | Facility: OTHER | Age: 30
End: 2025-07-03

## 2025-07-03 VITALS
BODY MASS INDEX: 32.67 KG/M2 | WEIGHT: 262.79 LBS | TEMPERATURE: 98.2 F | DIASTOLIC BLOOD PRESSURE: 86 MMHG | HEART RATE: 96 BPM | SYSTOLIC BLOOD PRESSURE: 140 MMHG | OXYGEN SATURATION: 100 % | HEIGHT: 75 IN

## 2025-07-03 DIAGNOSIS — M54.50 ACUTE MIDLINE LOW BACK PAIN WITHOUT SCIATICA: Primary | ICD-10-CM

## 2025-07-03 PROCEDURE — 99213 OFFICE O/P EST LOW 20 MIN: CPT | Performed by: FAMILY MEDICINE

## 2025-07-03 NOTE — LETTER
July 3, 2025     Patient: Anil Bailey  YOB: 1995  Date of Visit: 7/3/2025      To Whom it May Concern:    Anil Bailey is under my professional care. Anil was seen in my office on 7/3/2025. Anil may return to work on 7/5/2025.    If you have any questions or concerns, please don't hesitate to call.         Sincerely,          Dot Jiménez,         CC: No Recipients

## 2025-07-03 NOTE — TELEPHONE ENCOUNTER
Background - Initial Assessment  Clinical complaint: Pain is center low back, no radiation. Did have radiation into bilat thighs, that has went away. No numbness or tingling. Started 6/30/25. NKI. Pt was running on the treadmill on 6/30/25 but did not have any injury or pain then. No prior back pain other than a similar instance 6 years ago. No prior back SX. Is not following any doctors for this pain. Seen in ED 7/1/25. And Seen by PCP 7/3/25. Pain is constant and feels aching  Date of onset: 6/30/25  Frequency of pain: constant  Quality of pain: aching    Protocols used: [facility] Protocol

## 2025-07-03 NOTE — PROGRESS NOTES
Name: Anil Bailey      : 1995      MRN: [id number]  Encounter Provider: Dot Jiménez DO  Encounter Date: 7/3/2025   Encounter department: [facility]  :  Assessment & Plan  Acute midline low back pain without sciatica  ER note reviewed  Xray lumbar normal  Continue prednisone, flexeril, motrin, from ER  Recommend PT, add prn heat/ice  Follow up in 4-6 weeks to reassess, if not improving, consider MRI    Orders:    Ambulatory Referral to [facility]; Future      Return in about 5 weeks (around 2025) for Recheck back pain .       History of Present Illness   Chief Complaint   Patient presents with    Back Pain     Patient in office today for back pain.        Back Pain  This is a recurrent problem. The current episode started 1 to 4 weeks ago. The problem occurs constantly. The problem has been rapidly worsening since onset. The pain is present in the lumbar spine and sacro-iliac. The quality of the pain is described as aching and shooting. The pain radiates to the left thigh and right thigh. The pain is at a severity of 7/10. The pain is Worse during the night. The symptoms are aggravated by bending, position and twisting. Stiffness is present In the morning. Pertinent negatives include no abdominal pain, bladder incontinence, bowel incontinence, chest pain, dysuria, fever, headaches, leg pain, numbness, paresis, paresthesias, pelvic pain, perianal numbness, tingling, weakness or weight loss.     Review of Systems   Constitutional:  Negative for fever and weight loss.   Cardiovascular:  Negative for chest pain.   Gastrointestinal:  Negative for abdominal pain and bowel incontinence.   Genitourinary:  Negative for bladder incontinence, dysuria and pelvic pain.   Musculoskeletal:  Positive for back pain.   Neurological:  Negative for tingling, weakness, numbness, headaches and paresthesias.       Objective   /86 (BP Location: Left arm,  "Patient Position: Sitting, Cuff Size: Large)   Pulse 96   Temp 98.2 °F (36.8 °C) (Tympanic)   Ht 6' 3\" (1.905 m)   Wt 119 kg (262 lb 12.6 oz)   SpO2 100%   BMI 32.85 kg/m²      Physical Exam    Musculoskeletal:      Thoracic back: Normal range of motion.      Lumbar back: Spasms present. No swelling, edema or tenderness. Normal range of motion. Negative right straight leg raise test and negative left straight leg raise test.         "

## 2025-07-07 NOTE — TELEPHONE ENCOUNTER
Red Flag and Start Back documentation is currently located under Additional Charting.    [facility] was reviewed in detail and what we can provide for their back pain.  Patient is agreeable to being triaged and would like to proceed with Physical Therapy.    Referral was placed for Physical Therapy at the [place] site. Patients information was sent to the  to make evaluation appointment. Patient made aware that the PT office  will be calling to schedule the appointment.  Patient was provided with the phone number to the PT office.    No further questions and/or concerns were voiced by the patient at this time. Patient states understanding of the referral that was placed.

## 2025-07-07 NOTE — TELEPHONE ENCOUNTER
Called the patient to complete the triage process started on 7/3/25. Call went to .    Voice message left for patient to call back. Phone number and hours of business provided.     Referral Closed.  Triage can be completed when a call back is received.

## 2025-07-23 ENCOUNTER — EVALUATION (OUTPATIENT)
Dept: PHYSICAL THERAPY | Facility: CLINIC | Age: 30
End: 2025-07-23
Payer: COMMERCIAL

## 2025-07-23 DIAGNOSIS — M54.50 ACUTE MIDLINE LOW BACK PAIN WITHOUT SCIATICA: Primary | ICD-10-CM

## 2025-07-23 PROCEDURE — 97535 SELF CARE MNGMENT TRAINING: CPT | Performed by: PHYSICAL THERAPIST

## 2025-07-23 PROCEDURE — 97162 PT EVAL MOD COMPLEX 30 MIN: CPT | Performed by: PHYSICAL THERAPIST

## 2025-07-23 PROCEDURE — 97110 THERAPEUTIC EXERCISES: CPT | Performed by: PHYSICAL THERAPIST

## 2025-07-23 NOTE — HOME EXERCISE EDUCATION
Program_ID:[id number]   Access Code: [email or web address]  URL: [email or web address]  Date: 07-  Prepared By: Gustabo Emmanuel    Program Notes      Exercises      - Supine Posterior Pelvic Tilt - 1 x daily - 7 x weekly - 3 sets - 10 reps      - Supine March with Posterior Pelvic Tilt - 1 x daily - 7 x weekly - 3 sets - 10 reps      - Posterior Pelvic Tilt with Adduction Using Pillow in Hooklying - 1 x daily - 7 x weekly - 3 sets - 10 reps      - Bent Knee Fallouts - 1 x daily - 7 x weekly - 3 sets - 10 reps      - Supine Transversus Abdominis Bracing with Heel Slide - 1 x daily - 7 x weekly - 3 sets - 10 reps      - Clamshell - 1 x daily - 7 x weekly - 3 sets - 10 reps

## 2025-07-23 NOTE — PROGRESS NOTES
PT Evaluation     Today's date: 2025  Patient name: Anil Bailey  : 1995  MRN: [id number]  Referring provider: Dot Jiménez DO  Dx:   Encounter Diagnosis     ICD-10-CM    1. Acute midline low back pain without sciatica  M54.50 Ambulatory referral to PT spine                     Assessment  Impairments: abnormal or restricted ROM, activity intolerance, impaired physical strength, lacks appropriate home exercise program, pain with function and activity limitations    Assessment details: Patient is a 30 year old male who presents to PT with c/o pain in low back. PT examination shows limitations consistent with lumbar/core weakness including positive prone instability testing. Patient would benefit from PT intervention focused on stabilization. Based on patient's Start Back tool score of 3 patient is in low risk category. Patient was given and reviewed HEP today and was scheduled for f/u appt in 2 weeks if needed.      Prognosis: excellent    Goals  ST. Initiate HEP  2. Patient to report decreased pain at worst by 50% in 2 weeks    LT. Patient to report decreased pain at worst to 0-1/10 in 4 weeks  2. Patient to increase core/lumbar stability to WFL in 4 weeks  3. Patient to be independent and compliant with HEP in 4 weeks    Plan  Other planned modality interventions: Modalities PRN    Planned therapy interventions: abdominal trunk stabilization, manual therapy, neuromuscular re-education, patient/caregiver education, strengthening, stretching, therapeutic activities, therapeutic exercise and home exercise program    Frequency: 1-2x month  Duration in weeks: 4  Treatment plan discussed with: patient        Subjective Evaluation    History of Present Illness  Date of onset: 2025  Mechanism of injury: Patient presents to PT with c/o pain in lower back. Patient reports he started to notice some pain in low back at the beginning of . He reports it was not getting any better. He  states the beginning of July he missed a week of work due to the pain worsening. Patient did go to the ER on  and f/u with PCP on 7/3. Patient reports he was using medicine and hot pack when pain was severe. Patient denies any specific injury. Patient reports he has 2 previous back strains back in  and . Patient reports the pain is in the center of his lower back at L4-L5 area. He denies any LE pain or numbness.   Patient Goals  Patient goals for therapy: decreased pain    Pain  Current pain rating: 3  At best pain ratin  At worst pain ratin  Quality: dull ache  Relieving factors: medications and heat  Exacerbated by: Bending, car transfers.  Progression: no change    Social Support    Employment status: working    Diagnostic Tests  X-ray: normal  Treatments  Previous treatment: medication        Objective     Concurrent Complaints  Negative for night pain, disturbed sleep, bladder dysfunction, bowel dysfunction, saddle (S4) numbness, cardiac problem, kidney problem, gallbladder problem, stomach problem, ulcer, appendix problem, spleen problem, pancreas problem, history of cancer, history of trauma and infection    Postural Observations  Seated posture: fair  Standing posture: fair  Correction of posture: has no consistent effect      Palpation   Left   No palpable tenderness to the erector spinae, lumbar interspinals, lumbar paraspinals, rectus abdominus and transverse abdominus.     Right   No palpable tenderness to the erector spinae, lumbar interspinals, lumbar paraspinals, rectus abdominus and transverse abdominus.     Tenderness     Additional Tenderness Details  No TTP noted. Patient reports improvement in symptoms with pressure from palpation.     Neurological Testing     Sensation     Lumbar   Left   Intact: light touch, hot/cold discrimination and proprioception    Right   Intact: light touch, hot/cold discrimination and proprioception    Active Range of Motion     Lumbar   Flexion:   "with pain Restriction level: minimal  Extension:  WFL  Left lateral flexion:  WFL  Right lateral flexion:  WFL  Left rotation:  WFL  Right rotation:  WFL    Joint Play   Joints within functional limits: T8, T9, T10, T11, T12, L1, L2, L3, L4, L5 and S1     Strength/Myotome Testing     Lumbar   Left   Normal strength    Right   Normal strength    Additional Strength Details  Pain noted in low back with seated knee extension    Muscle Activation   Patient unable to activate left transverse abdominals and right transverse abdominals.     Tests     Lumbar   Positive prone instability .   Negative sacral spring .     Left   Negative passive SLR.     Right   Negative passive SLR.     Left Hip   Negative LESLIE, FADIR and long sit.     Right Hip   Negative LESLIE, FADIR and long sit.     General Comments:    Lower quarter screen   Hips: unremarkable  Knees: unremarkable  Foot/ankle: unremarkable                Precautions None       Manuals 7/23                                       Neuro Re-Ed         PPT 2x10 3\" hold       PPT with march 10x B/L       PPT with heel slide        PPT with knee fallout 10x B/L       PPT with supine SLR        [facility]        Supine hip adduction squeeze                                        Ther Ex                                                                        Ther Activity                        Gait Training                        Modalities                                  "

## 2025-07-26 ENCOUNTER — RA CDI HCC (OUTPATIENT)
Dept: OTHER | Facility: HOSPITAL | Age: 30
End: 2025-07-26

## 2025-08-06 ENCOUNTER — OFFICE VISIT (OUTPATIENT)
Dept: PHYSICAL THERAPY | Facility: CLINIC | Age: 30
End: 2025-08-06
Payer: COMMERCIAL

## 2025-08-06 DIAGNOSIS — M54.50 ACUTE MIDLINE LOW BACK PAIN WITHOUT SCIATICA: Primary | ICD-10-CM

## 2025-08-06 PROCEDURE — 97112 NEUROMUSCULAR REEDUCATION: CPT | Performed by: PHYSICAL THERAPIST

## 2025-08-07 ENCOUNTER — OFFICE VISIT (OUTPATIENT)
Dept: FAMILY MEDICINE CLINIC | Facility: CLINIC | Age: 30
End: 2025-08-07
Payer: COMMERCIAL

## 2025-08-07 ENCOUNTER — TELEPHONE (OUTPATIENT)
Age: 30
End: 2025-08-07

## 2025-08-07 VITALS
HEART RATE: 94 BPM | WEIGHT: 259.26 LBS | BODY MASS INDEX: 32.41 KG/M2 | OXYGEN SATURATION: 98 % | DIASTOLIC BLOOD PRESSURE: 65 MMHG | SYSTOLIC BLOOD PRESSURE: 125 MMHG

## 2025-08-07 DIAGNOSIS — M54.50 ACUTE MIDLINE LOW BACK PAIN WITHOUT SCIATICA: Primary | ICD-10-CM

## 2025-08-07 PROCEDURE — 99213 OFFICE O/P EST LOW 20 MIN: CPT | Performed by: FAMILY MEDICINE
